# Patient Record
Sex: FEMALE | Race: WHITE | NOT HISPANIC OR LATINO | Employment: UNEMPLOYED | URBAN - METROPOLITAN AREA
[De-identification: names, ages, dates, MRNs, and addresses within clinical notes are randomized per-mention and may not be internally consistent; named-entity substitution may affect disease eponyms.]

---

## 2023-10-13 DIAGNOSIS — E28.2 PCOS (POLYCYSTIC OVARIAN SYNDROME): Primary | ICD-10-CM

## 2023-10-13 RX ORDER — METFORMIN HYDROCHLORIDE 500 MG/1
500 TABLET ORAL
Qty: 30 TABLET | Refills: 0 | Status: SHIPPED | OUTPATIENT
Start: 2023-10-13 | End: 2023-11-14

## 2023-10-13 NOTE — TELEPHONE ENCOUNTER
Pt was ast seen 5/24/23 and has an upcoming appt scheduled 11/15/23. She takes metformin and is requesting a RF. Pt is aware 1yr of PNV's were sent to pharm in 6/2023. Metformin RF request and Rx sent to Dr. Sams to sign and send to pharm.

## 2023-11-15 ENCOUNTER — APPOINTMENT (OUTPATIENT)
Dept: OBSTETRICS AND GYNECOLOGY | Facility: CLINIC | Age: 28
End: 2023-11-15
Payer: MEDICAID

## 2023-12-14 ENCOUNTER — OFFICE VISIT (OUTPATIENT)
Dept: OBSTETRICS AND GYNECOLOGY | Facility: CLINIC | Age: 28
End: 2023-12-14
Payer: MEDICAID

## 2023-12-14 VITALS
HEIGHT: 67 IN | SYSTOLIC BLOOD PRESSURE: 108 MMHG | WEIGHT: 137 LBS | DIASTOLIC BLOOD PRESSURE: 74 MMHG | BODY MASS INDEX: 21.5 KG/M2

## 2023-12-14 DIAGNOSIS — Z01.419 WELL WOMAN EXAM: Primary | ICD-10-CM

## 2023-12-14 DIAGNOSIS — Z12.4 CERVICAL CANCER SCREENING: ICD-10-CM

## 2023-12-14 DIAGNOSIS — R10.2 PELVIC PAIN: ICD-10-CM

## 2023-12-14 DIAGNOSIS — Z31.69 ENCOUNTER FOR PRECONCEPTION CONSULTATION: ICD-10-CM

## 2023-12-14 PROCEDURE — 87800 DETECT AGNT MULT DNA DIREC: CPT

## 2023-12-14 PROCEDURE — 88141 CYTOPATH C/V INTERPRET: CPT | Performed by: PATHOLOGY

## 2023-12-14 PROCEDURE — 88175 CYTOPATH C/V AUTO FLUID REDO: CPT

## 2023-12-14 PROCEDURE — 87624 HPV HI-RISK TYP POOLED RSLT: CPT

## 2023-12-14 PROCEDURE — 1036F TOBACCO NON-USER: CPT | Performed by: NURSE PRACTITIONER

## 2023-12-14 PROCEDURE — 87661 TRICHOMONAS VAGINALIS AMPLIF: CPT

## 2023-12-14 PROCEDURE — 99395 PREV VISIT EST AGE 18-39: CPT | Performed by: NURSE PRACTITIONER

## 2023-12-14 RX ORDER — KETOCONAZOLE 20 MG/ML
SHAMPOO, SUSPENSION TOPICAL
COMMUNITY
Start: 2023-06-29 | End: 2024-03-21 | Stop reason: ALTCHOICE

## 2023-12-14 RX ORDER — IBUPROFEN 600 MG/1
600 TABLET ORAL EVERY 6 HOURS PRN
COMMUNITY
Start: 2013-07-03 | End: 2024-03-21 | Stop reason: ALTCHOICE

## 2023-12-14 RX ORDER — MELATONIN 1 MG
TABLET,CHEWABLE ORAL
COMMUNITY
Start: 2020-10-19

## 2023-12-14 ASSESSMENT — PAIN SCALES - GENERAL: PAINLEVEL: 4

## 2023-12-14 NOTE — PROGRESS NOTES
Assessment/Plan   Diagnoses and all orders for this visit:  Well woman exam  Pelvic pain  -     Referral to Physical Therapy; Future  -     US PELVIS TRANSABDOMINAL WITH TRANSVAGINAL; Future  Cervical cancer screening  -     THINPREP PAP TEST  -     C. Trachomatis / N. Gonorrhoeae, Amplified Detection  -     Trichomonas vaginalis, Nucleic Acid Detection  Encounter for preconception consultation    - Encouraged patient to continue using Ovusense and timing intercourse around ovulation    - Advised to return for further work up and referral to fertility clinic if unable to successfully get pregnant after trying 12 months.     Vianey Ziegler, APRN-CNM, APRN-CNP     Subjective   Marcella Lu is a 28 y.o. female who is here for a routine exam.     Concerns today:  Irregular spotting in between periods, new onset in last few months. Having vaginal spotting around ovulation. Reports IUD removed this summer, trying to conceive. Periods were very irregular prior to IUD (40-60 day cycles), reports h/o PCOS.     Patient's last menstrual period was 2023.   Periods are now regular every 28-30 days, lasting 5-8 days.   Dysmenorrhea:none.   Cyclic symptoms include none.     Using Ovusense (basal temp), reports she is getting +ovulation mostly every month.    Sexual Activity: sexually active, male partners; Patient reports 1 partners in the last 12 months.  Pain with intercourse? Yes with certain positions  Loss of desire? No  Able to have an orgasm? Yes     History of prior STI: none    Current contraception: none    Last pap: 2020 NILM  History of abnormal Pap smear: no  Family history of uterine or ovarian cancer: no    Last mammogram: N/A  History of abnormal mammogram: no  Family history of breast cancer: no  Menstrual History:  OB History          1    Para   1    Term   1       0    AB   0    Living   1         SAB   0    IAB   0    Ectopic   0    Multiple   0    Live Births   1               "  Patient's last menstrual period was 11/28/2023.       Objective   /74   Ht 1.702 m (5' 7\")   Wt 62.1 kg (137 lb)   LMP 11/28/2023   BMI 21.46 kg/m²   Physical Exam  Constitutional:       General: She is not in acute distress.     Appearance: Normal appearance.   Genitourinary:      Vulva normal.      Right Labia: No rash, tenderness or skin changes.     Left Labia: No tenderness, skin changes or rash.     No vaginal discharge, erythema, tenderness or bleeding.      No cervical discharge, friability, lesion or polyp.   Breasts:     Breasts are symmetrical.      Breasts are soft.     Right: No mass, nipple discharge, skin change or tenderness.      Left: Normal. No mass, nipple discharge, skin change or tenderness.   Neck:      Thyroid: No thyroid mass, thyromegaly or thyroid tenderness.   Cardiovascular:      Rate and Rhythm: Normal rate and regular rhythm.   Pulmonary:      Effort: Pulmonary effort is normal. No respiratory distress.      Breath sounds: Normal breath sounds.   Abdominal:      Palpations: Abdomen is soft.      Tenderness: There is no abdominal tenderness.   Musculoskeletal:      Cervical back: Neck supple.   Lymphadenopathy:      Upper Body:      Right upper body: No axillary adenopathy.      Left upper body: No axillary adenopathy.   Neurological:      General: No focal deficit present.      Mental Status: She is alert and oriented to person, place, and time. Mental status is at baseline.   Skin:     General: Skin is warm and dry.   Psychiatric:         Mood and Affect: Mood normal.         Behavior: Behavior normal.         Thought Content: Thought content normal.         Judgment: Judgment normal.   Vitals and nursing note reviewed.        "

## 2023-12-16 LAB
C TRACH RRNA SPEC QL NAA+PROBE: NEGATIVE
N GONORRHOEA DNA SPEC QL PROBE+SIG AMP: NEGATIVE
T VAGINALIS RRNA SPEC QL NAA+PROBE: NEGATIVE

## 2024-01-03 ENCOUNTER — ANCILLARY PROCEDURE (OUTPATIENT)
Dept: RADIOLOGY | Facility: CLINIC | Age: 29
End: 2024-01-03
Payer: COMMERCIAL

## 2024-01-03 DIAGNOSIS — R10.2 PELVIC PAIN: ICD-10-CM

## 2024-01-03 PROCEDURE — 76856 US EXAM PELVIC COMPLETE: CPT | Performed by: STUDENT IN AN ORGANIZED HEALTH CARE EDUCATION/TRAINING PROGRAM

## 2024-01-03 PROCEDURE — 76856 US EXAM PELVIC COMPLETE: CPT

## 2024-01-03 PROCEDURE — 76830 TRANSVAGINAL US NON-OB: CPT | Performed by: STUDENT IN AN ORGANIZED HEALTH CARE EDUCATION/TRAINING PROGRAM

## 2024-01-10 DIAGNOSIS — E28.2 PCOS (POLYCYSTIC OVARIAN SYNDROME): ICD-10-CM

## 2024-01-10 LAB
CYTOLOGY CMNT CVX/VAG CYTO-IMP: NORMAL
HPV HR GENOTYPES PNL CVX NAA+PROBE: NEGATIVE
LAB AP HPV GENOTYPE QUESTION: NO
LAB AP HPV HR: NORMAL
LAB AP PAP ADDITIONAL TESTS: NORMAL
LABORATORY COMMENT REPORT: NORMAL
LMP START DATE: NORMAL
PATH REPORT.TOTAL CANCER: NORMAL

## 2024-01-10 RX ORDER — METFORMIN HYDROCHLORIDE 500 MG/1
TABLET ORAL
Qty: 30 TABLET | Refills: 11 | Status: SHIPPED | OUTPATIENT
Start: 2024-01-10

## 2024-02-12 ENCOUNTER — LAB (OUTPATIENT)
Dept: LAB | Facility: LAB | Age: 29
End: 2024-02-12
Payer: COMMERCIAL

## 2024-02-12 ENCOUNTER — TELEPHONE (OUTPATIENT)
Dept: OBSTETRICS AND GYNECOLOGY | Facility: CLINIC | Age: 29
End: 2024-02-12
Payer: COMMERCIAL

## 2024-02-12 DIAGNOSIS — N92.6 MISSED MENSES: Primary | ICD-10-CM

## 2024-02-12 DIAGNOSIS — N92.6 MISSED MENSES: ICD-10-CM

## 2024-02-12 LAB — B-HCG SERPL-ACNC: <2 MIU/ML

## 2024-02-12 PROCEDURE — 36415 COLL VENOUS BLD VENIPUNCTURE: CPT

## 2024-02-12 PROCEDURE — 84702 CHORIONIC GONADOTROPIN TEST: CPT

## 2024-02-29 ENCOUNTER — TELEPHONE (OUTPATIENT)
Dept: OBSTETRICS AND GYNECOLOGY | Facility: CLINIC | Age: 29
End: 2024-02-29
Payer: COMMERCIAL

## 2024-02-29 NOTE — TELEPHONE ENCOUNTER
Fax recieved from [facility], pt dental office requesting a note stating that pt can have dental tx during current pregnancy.  dental note with guidelines faxed back to them @ [phone]. Copy scanned into chart.

## 2024-03-21 ENCOUNTER — INITIAL PRENATAL (OUTPATIENT)
Dept: OBSTETRICS AND GYNECOLOGY | Facility: CLINIC | Age: 29
End: 2024-03-21
Payer: COMMERCIAL

## 2024-03-21 ENCOUNTER — LAB (OUTPATIENT)
Dept: LAB | Facility: LAB | Age: 29
End: 2024-03-21
Payer: COMMERCIAL

## 2024-03-21 VITALS — BODY MASS INDEX: 21.93 KG/M2 | SYSTOLIC BLOOD PRESSURE: 118 MMHG | DIASTOLIC BLOOD PRESSURE: 60 MMHG | WEIGHT: 140 LBS

## 2024-03-21 DIAGNOSIS — O36.80X0 PREGNANCY WITH INCONCLUSIVE FETAL VIABILITY (HHS-HCC): Primary | ICD-10-CM

## 2024-03-21 DIAGNOSIS — O36.80X0 PREGNANCY WITH INCONCLUSIVE FETAL VIABILITY (HHS-HCC): ICD-10-CM

## 2024-03-21 LAB — PREGNANCY TEST URINE, POC: POSITIVE

## 2024-03-21 PROCEDURE — 84702 CHORIONIC GONADOTROPIN TEST: CPT

## 2024-03-21 PROCEDURE — 81025 URINE PREGNANCY TEST: CPT | Performed by: ADVANCED PRACTICE MIDWIFE

## 2024-03-21 PROCEDURE — 36415 COLL VENOUS BLD VENIPUNCTURE: CPT

## 2024-03-21 PROCEDURE — 99213 OFFICE O/P EST LOW 20 MIN: CPT | Performed by: ADVANCED PRACTICE MIDWIFE

## 2024-03-21 ASSESSMENT — EDINBURGH POSTNATAL DEPRESSION SCALE (EPDS)
I HAVE FELT SAD OR MISERABLE: NO, NOT AT ALL
I HAVE BEEN SO UNHAPPY THAT I HAVE BEEN CRYING: NO, NEVER
I HAVE BEEN ABLE TO LAUGH AND SEE THE FUNNY SIDE OF THINGS: AS MUCH AS I ALWAYS COULD
I HAVE BEEN ANXIOUS OR WORRIED FOR NO GOOD REASON: NO, NOT AT ALL
I HAVE BLAMED MYSELF UNNECESSARILY WHEN THINGS WENT WRONG: NO, NEVER
I HAVE FELT SCARED OR PANICKY FOR NO GOOD REASON: NO, NOT AT ALL
THINGS HAVE BEEN GETTING ON TOP OF ME: NO, MOST OF THE TIME I HAVE COPED QUITE WELL
THE THOUGHT OF HARMING MYSELF HAS OCCURRED TO ME: NEVER
I HAVE BEEN SO UNHAPPY THAT I HAVE HAD DIFFICULTY SLEEPING: NOT AT ALL
TOTAL SCORE: 1
I HAVE LOOKED FORWARD WITH ENJOYMENT TO THINGS: AS MUCH AS I EVER DID

## 2024-03-21 NOTE — PROGRESS NOTES
"Assessment/Plan   Problem List Items Addressed This Visit    None  Visit Diagnoses         Codes    Pregnancy with inconclusive fetal viability    -  Primary O36.80X0    Relevant Orders    Human Chorionic Gonadotropin, Serum Quantitative        Beta ordered today, plan to follow pending results    Torie Garcia, DEJA-ROSA     Subjective   Marcella Lu is a 28 y.o. female who presents for evaluation of  inconclusive fetal viability  . The patient believes they could be pregnant. Pregnancy is desired. Sexual Activity: single partner, contraception: none. Current symptoms also include: fatigue and positive home pregnancy test. Last period was abnormal, hx of PCOS.  Menstrual cycles typically last about 42-46 days.   LMP 1/25/2024 had intermittent spotting until 2/2/2024. Light spotting of pink discharge. No spotting since 2/2. 2/12 neg hCG but 2/28 pos at home urine test. Reports mild lower abd cramping that feels \"stretchy\".      Patient's last menstrual period was 01/25/2024.    Objective   /60   Wt 63.5 kg (140 lb)   LMP 01/25/2024   BMI 21.93 kg/m²     Physical Exam  Constitutional:       Appearance: Normal appearance.   Cardiovascular:      Rate and Rhythm: Normal rate and regular rhythm.   Pulmonary:      Effort: Pulmonary effort is normal.      Breath sounds: Normal breath sounds.   Neurological:      Mental Status: She is alert.   Psychiatric:         Mood and Affect: Mood normal.         Behavior: Behavior normal.   Vitals reviewed.          Lab Review  Urine HCG: pending  "

## 2024-03-22 ENCOUNTER — TELEPHONE (OUTPATIENT)
Dept: OBSTETRICS AND GYNECOLOGY | Facility: CLINIC | Age: 29
End: 2024-03-22

## 2024-03-22 ENCOUNTER — APPOINTMENT (OUTPATIENT)
Dept: OBSTETRICS AND GYNECOLOGY | Facility: CLINIC | Age: 29
End: 2024-03-22
Payer: COMMERCIAL

## 2024-03-22 DIAGNOSIS — O36.80X0 PREGNANCY WITH INCONCLUSIVE FETAL VIABILITY, SINGLE OR UNSPECIFIED FETUS (HHS-HCC): ICD-10-CM

## 2024-03-22 LAB — B-HCG SERPL-ACNC: ABNORMAL MIU/ML

## 2024-03-22 NOTE — TELEPHONE ENCOUNTER
Attempted to call pt to discuss need for US.   Got her voice mail.     Message left to call office.   Order placed in system.

## 2024-03-22 NOTE — TELEPHONE ENCOUNTER
----- Message from Katherine Garcia APRN-CNM sent at 3/22/2024  8:51 AM EDT -----  Dating ultrasound asap

## 2024-03-29 ENCOUNTER — TELEPHONE (OUTPATIENT)
Dept: OBSTETRICS AND GYNECOLOGY | Facility: CLINIC | Age: 29
End: 2024-03-29
Payer: COMMERCIAL

## 2024-03-29 ENCOUNTER — HOSPITAL ENCOUNTER (OUTPATIENT)
Dept: RADIOLOGY | Facility: CLINIC | Age: 29
Discharge: HOME | End: 2024-03-29
Payer: COMMERCIAL

## 2024-03-29 DIAGNOSIS — O36.80X0 PREGNANCY WITH INCONCLUSIVE FETAL VIABILITY, SINGLE OR UNSPECIFIED FETUS (HHS-HCC): ICD-10-CM

## 2024-03-29 PROCEDURE — 76801 OB US < 14 WKS SINGLE FETUS: CPT

## 2024-03-29 PROCEDURE — 76815 OB US LIMITED FETUS(S): CPT | Performed by: OBSTETRICS & GYNECOLOGY

## 2024-03-29 NOTE — TELEPHONE ENCOUNTER
Office received message from shaheen regarding MAB findings at pts US appt today.  CNM asking office to call pt.    Pt contacted.    Having no bleeding or pain currently. MAB findings discussed.   Talked about medical and surgical management.   She will talk it over further with her .  For now, pt would like prescriptions called in for misoprostol, ibuprofen and zofran.  Pt may decide to take these this weekend.  Bleeding and cramping expectations explained.  Understanding voiced.

## 2024-04-01 ENCOUNTER — TELEPHONE (OUTPATIENT)
Dept: OBSTETRICS AND GYNECOLOGY | Facility: CLINIC | Age: 29
End: 2024-04-01
Payer: COMMERCIAL

## 2024-04-01 ENCOUNTER — TELEPHONE (OUTPATIENT)
Dept: OBSTETRICS AND GYNECOLOGY | Facility: HOSPITAL | Age: 29
End: 2024-04-01
Payer: COMMERCIAL

## 2024-04-01 DIAGNOSIS — O02.1 MISSED ABORTION (HHS-HCC): Primary | ICD-10-CM

## 2024-04-01 RX ORDER — DOXYCYCLINE 100 MG/1
400 CAPSULE ORAL ONCE
Qty: 4 CAPSULE | Refills: 0 | Status: SHIPPED | OUTPATIENT
Start: 2024-04-01 | End: 2024-04-03 | Stop reason: ALTCHOICE

## 2024-04-01 NOTE — TELEPHONE ENCOUNTER
Patient called to schedule D/ C. Patient informed there no nursing staff at [facility] office to get this scheduled at this time and that I will send out a message for them to give her a call tomorrow to get scheduled.

## 2024-04-02 ENCOUNTER — TELEPHONE (OUTPATIENT)
Dept: OBSTETRICS AND GYNECOLOGY | Facility: CLINIC | Age: 29
End: 2024-04-02
Payer: COMMERCIAL

## 2024-04-02 ENCOUNTER — DOCUMENTATION (OUTPATIENT)
Dept: OBSTETRICS AND GYNECOLOGY | Facility: HOSPITAL | Age: 29
End: 2024-04-02
Payer: COMMERCIAL

## 2024-04-02 NOTE — TELEPHONE ENCOUNTER
Called patient to review recent diagnosis and messages today regarding scheduling D&C.     Pt. Counseled last week regarding management of missed AB. Initially desired Miso, however pt. Spoke to family members and decided against it.     Interested in surgical management, reviewed IPASS procedure. Pt. To be scheduled this week (Wed) with Dr. Orr.   Instructed to call office with any concerns prior.     Katherine Garcia, DEJA-ROSA

## 2024-04-02 NOTE — TELEPHONE ENCOUNTER
Referral for I-PASS per Katherine Garcia.  Scheduled 4/3/24 2:30 pm per Dr Orr instructions reviewed Pt agrees

## 2024-04-03 ENCOUNTER — OFFICE VISIT (OUTPATIENT)
Dept: OBSTETRICS AND GYNECOLOGY | Facility: CLINIC | Age: 29
End: 2024-04-03
Payer: COMMERCIAL

## 2024-04-03 VITALS
BODY MASS INDEX: 22.33 KG/M2 | DIASTOLIC BLOOD PRESSURE: 71 MMHG | HEART RATE: 109 BPM | WEIGHT: 142.6 LBS | SYSTOLIC BLOOD PRESSURE: 142 MMHG

## 2024-04-03 DIAGNOSIS — O02.1 MISSED ABORTION (HHS-HCC): Primary | ICD-10-CM

## 2024-04-03 PROCEDURE — 59820 CARE OF MISCARRIAGE: CPT | Performed by: OBSTETRICS & GYNECOLOGY

## 2024-04-03 PROCEDURE — 1036F TOBACCO NON-USER: CPT | Performed by: OBSTETRICS & GYNECOLOGY

## 2024-04-03 PROCEDURE — 99213 OFFICE O/P EST LOW 20 MIN: CPT | Performed by: OBSTETRICS & GYNECOLOGY

## 2024-04-03 PROCEDURE — 88305 TISSUE EXAM BY PATHOLOGIST: CPT | Mod: TC,SUR | Performed by: OBSTETRICS & GYNECOLOGY

## 2024-04-03 PROCEDURE — 88305 TISSUE EXAM BY PATHOLOGIST: CPT | Performed by: PATHOLOGY

## 2024-04-03 RX ORDER — IBUPROFEN 800 MG/1
TABLET ORAL
COMMUNITY
Start: 2024-03-29

## 2024-04-03 RX ORDER — ONDANSETRON 4 MG/1
TABLET, FILM COATED ORAL
COMMUNITY
Start: 2024-03-29

## 2024-04-03 RX ORDER — MISOPROSTOL 200 UG/1
TABLET ORAL
COMMUNITY
Start: 2024-03-29 | End: 2024-04-03 | Stop reason: ALTCHOICE

## 2024-04-03 NOTE — PROGRESS NOTES
Pt having procedure for D&C tomorrow.  Calls now,  as she had not picked up her pre-op medicine from the pharmacy, that she was to have taken this evening.  Pt states procedure at 2:30 pm tomorrow  Review of chart has only Rx for doxycycline.  Disc, and pt will  tomorrow, as pharmacy opens at 7 am.  To take meds w water only and keep appt for tomorrow afternoon.  Pt states unaware if she should be NPO.  Encouraged to call office first thing in morning for clarification.

## 2024-04-03 NOTE — LETTER
April 3, 2024     KOURTNEY Odonnell  [address]  Department Of Ob/Gyn-Nurse Midwifery  [facility] [address]    Patient: Marcella Lu   YOB: 1995   Date of Visit: 4/3/2024       Dear Dr. Katherine Garcia, KOURTNEY:    Thank you for referring Marcella Lu to me for evaluation. Below are my notes for this consultation.  If you have questions, please do not hesitate to call me. I look forward to following your patient along with you.       Sincerely,     Yandy Orr MD      CC: No Recipients  ______________________________________________________________________________________            Marcella Lu presents today with:   Here for miscarriage management after diagnosed with SAB on 3/29/24  Previously counselled on options; further counselled today, desires surgical management.  D&C performed, tolerated well.    Offered genetic analysis of pregnancy tissue, declines  Post procedure precautions discussed  Rh positive, no indication for Rhogam.    Desires to follow-up with Katherine Garcia  Offered bereavement services, declines at this time.    Discussed possibility of pregnancy        -----------------------------------------------------------------------  HPI    Presents today with  Missed ab measuring 8 wks  History of prior normal pregnancy,   Reports PCOS, but no other medical conditions        Visit Vitals  /71   Pulse 109   Wt 64.7 kg (142 lb 9.6 oz)   LMP 2024   Breastfeeding Unknown   BMI 22.33 kg/m²   OB Status Recent pregnancy   Smoking Status Never   BSA 1.75 m²       Physical Exam  Constitutional:       Appearance: Normal appearance.   Genitourinary:      Vulva normal.      Genitourinary Comments: Cervix FT, anteverted uterus     Pulmonary:      Effort: Pulmonary effort is normal.   Abdominal:      General: Abdomen is flat.      Palpations: Abdomen is soft.   Musculoskeletal:      Cervical back: Neck supple.   Neurological:       General: No focal deficit present.      Mental Status: She is alert and oriented to person, place, and time.   Skin:     General: Skin is warm.   Psychiatric:         Mood and Affect: Mood normal.         Behavior: Behavior normal.         Thought Content: Thought content normal.       Dilation and curettage    Date/Time: 4/3/2024 4:54 PM    Performed by: Yandy Orr MD  Authorized by: Yandy Orr MD    Consent:     Consent obtained:  Written    Consent given by:  Patient    Risks, benefits, and alternatives were discussed: yes      Risks discussed:  Bleeding, infection and pain  Universal protocol:     Immediately prior to procedure, a time out was called: yes      Patient identity confirmed:  Verbally with patient  Indications:     Indications:  Missed  @ 8 wks  Pre-procedure details:     Skin preparation:  Chlorhexidine    Preparation: Patient was prepped and draped in the usual sterile fashion    Sedation:     Sedation type:  None  Anesthesia:     Anesthesia method:  Local infiltration    Local anesthetic:  Lidocaine 1% w/o epi  Procedure specific details:        patient placed in lithotomy. bimanual exam performed, uterus anteverted, cervix FT dilated.   speculum placed in vagina, with good visualization of the cervix. chlorhexidine used to clean the face of the cervix.   1 mL of 1% lidocaine was injected @ 12 o'clock, with 10 mL @ 3 o'clock, and 10 mL @ 9 o'clock.   a single toothed tenaculum was placed @ 12 o'clock.   the cervix was serially dilated to 8 mm with the bailey dilators under ultrasound visualization. the MVA 7 mm curette was advanced to the fundus, and the device was activated. 2 passes were performed, with a gritty texture noted after the 2nd pass.   on bedside u/s, the endometrial stripe was noted to be thin. bleeding was minimal at the end of the procedure. the instruments were removed from the vagina. all counts were correct.   the products of conception were examined. villi  and a GS were noted. the patient tolerated the procedure well.    Post-procedure details:     Procedure completion:  Tolerated

## 2024-04-03 NOTE — PROGRESS NOTES
Marcella Lu presents today with:   Here for miscarriage management after diagnosed with SAB on 3/29/24  Previously counselled on options; further counselled today, desires surgical management.  D&C performed, tolerated well.    Offered genetic analysis of pregnancy tissue, declines  Post procedure precautions discussed  Rh positive, no indication for Rhogam.    Desires to follow-up with Katherine Garcia  Offered bereavement services, declines at this time.    Discussed possibility of pregnancy        -----------------------------------------------------------------------  HPI    Presents today with  Missed ab measuring 8 wks  History of prior normal pregnancy,   Reports PCOS, but no other medical conditions        Visit Vitals  /71   Pulse 109   Wt 64.7 kg (142 lb 9.6 oz)   LMP 2024   Breastfeeding Unknown   BMI 22.33 kg/m²   OB Status Recent pregnancy   Smoking Status Never   BSA 1.75 m²       Physical Exam  Constitutional:       Appearance: Normal appearance.   Genitourinary:      Vulva normal.      Genitourinary Comments: Cervix FT, anteverted uterus     Pulmonary:      Effort: Pulmonary effort is normal.   Abdominal:      General: Abdomen is flat.      Palpations: Abdomen is soft.   Musculoskeletal:      Cervical back: Neck supple.   Neurological:      General: No focal deficit present.      Mental Status: She is alert and oriented to person, place, and time.   Skin:     General: Skin is warm.   Psychiatric:         Mood and Affect: Mood normal.         Behavior: Behavior normal.         Thought Content: Thought content normal.       Dilation and curettage    Date/Time: 4/3/2024 4:54 PM    Performed by: Yandy Orr MD  Authorized by: Yandy Orr MD    Consent:     Consent obtained:  Written    Consent given by:  Patient    Risks, benefits, and alternatives were discussed: yes      Risks discussed:  Bleeding, infection and pain  Universal protocol:     Immediately prior to  procedure, a time out was called: yes      Patient identity confirmed:  Verbally with patient  Indications:     Indications:  Missed  @ 8 wks  Pre-procedure details:     Skin preparation:  Chlorhexidine    Preparation: Patient was prepped and draped in the usual sterile fashion    Sedation:     Sedation type:  None  Anesthesia:     Anesthesia method:  Local infiltration    Local anesthetic:  Lidocaine 1% w/o epi  Procedure specific details:        patient placed in lithotomy. bimanual exam performed, uterus anteverted, cervix FT dilated.   speculum placed in vagina, with good visualization of the cervix. chlorhexidine used to clean the face of the cervix.   1 mL of 1% lidocaine was injected @ 12 o'clock, with 10 mL @ 3 o'clock, and 10 mL @ 9 o'clock.   a single toothed tenaculum was placed @ 12 o'clock.   the cervix was serially dilated to 8 mm with the bailey dilators under ultrasound visualization. the MVA 7 mm curette was advanced to the fundus, and the device was activated. 2 passes were performed, with a gritty texture noted after the 2nd pass.   on bedside u/s, the endometrial stripe was noted to be thin. bleeding was minimal at the end of the procedure. the instruments were removed from the vagina. all counts were correct.   the products of conception were examined. villi and a GS were noted. the patient tolerated the procedure well.    Post-procedure details:     Procedure completion:  Tolerated

## 2024-04-08 ENCOUNTER — TELEPHONE (OUTPATIENT)
Dept: OBSTETRICS AND GYNECOLOGY | Facility: CLINIC | Age: 29
End: 2024-04-08
Payer: COMMERCIAL

## 2024-04-08 LAB
LABORATORY COMMENT REPORT: NORMAL
PATH REPORT.FINAL DX SPEC: NORMAL
PATH REPORT.GROSS SPEC: NORMAL
PATH REPORT.RELEVANT HX SPEC: NORMAL
PATH REPORT.TOTAL CANCER: NORMAL

## 2024-04-08 NOTE — TELEPHONE ENCOUNTER
Patient called with concerns of FEATURES CONSISTENT WITH EARLY PARTIAL HYDATIDIFORM MOLE meant. Patient informed it meant there was some fetal tissue present. Patient schedule for a follow up appointment with Katherine SHORT.

## 2024-04-10 ENCOUNTER — LAB (OUTPATIENT)
Dept: LAB | Facility: LAB | Age: 29
End: 2024-04-10
Payer: COMMERCIAL

## 2024-04-10 DIAGNOSIS — O01.1 PARTIAL HYDATIDIFORM MOLE (HHS-HCC): ICD-10-CM

## 2024-04-10 DIAGNOSIS — O01.1 PARTIAL HYDATIDIFORM MOLE (HHS-HCC): Primary | ICD-10-CM

## 2024-04-10 DIAGNOSIS — O08.89 PARTIAL MOLAR PREGNANCY (HHS-HCC): Primary | ICD-10-CM

## 2024-04-10 LAB — B-HCG SERPL-ACNC: 1278 MIU/ML

## 2024-04-10 PROCEDURE — 36415 COLL VENOUS BLD VENIPUNCTURE: CPT

## 2024-04-10 PROCEDURE — 84702 CHORIONIC GONADOTROPIN TEST: CPT

## 2024-04-10 NOTE — PROGRESS NOTES
Discussed results of partial mole on pathology.    Reviewed pathophysiology (genetics of the molar pregnancies), importance of hCG surveillance, avoiding pregnancy, risk of persistent GTN, and briefly planning for next pregnancy (after surveillance completed).    Questions/concerns addressed.    Will plan for weekly hCG levels.

## 2024-04-17 ENCOUNTER — APPOINTMENT (OUTPATIENT)
Dept: OBSTETRICS AND GYNECOLOGY | Facility: CLINIC | Age: 29
End: 2024-04-17
Payer: COMMERCIAL

## 2024-04-19 ENCOUNTER — LAB (OUTPATIENT)
Dept: LAB | Facility: LAB | Age: 29
End: 2024-04-19
Payer: COMMERCIAL

## 2024-04-19 DIAGNOSIS — O08.89 PARTIAL MOLAR PREGNANCY (HHS-HCC): ICD-10-CM

## 2024-04-19 LAB — B-HCG SERPL-ACNC: 60 MIU/ML

## 2024-04-19 PROCEDURE — 84702 CHORIONIC GONADOTROPIN TEST: CPT

## 2024-04-19 PROCEDURE — 36415 COLL VENOUS BLD VENIPUNCTURE: CPT

## 2024-04-22 DIAGNOSIS — O01.1 PARTIAL HYDATIDIFORM MOLE (HHS-HCC): Primary | ICD-10-CM

## 2024-05-02 ENCOUNTER — TELEPHONE (OUTPATIENT)
Dept: OBSTETRICS AND GYNECOLOGY | Facility: CLINIC | Age: 29
End: 2024-05-02

## 2024-05-02 ENCOUNTER — APPOINTMENT (OUTPATIENT)
Dept: OBSTETRICS AND GYNECOLOGY | Facility: CLINIC | Age: 29
End: 2024-05-02
Payer: COMMERCIAL

## 2024-05-07 ENCOUNTER — OFFICE VISIT (OUTPATIENT)
Dept: OTOLARYNGOLOGY | Facility: CLINIC | Age: 29
End: 2024-05-07
Payer: COMMERCIAL

## 2024-05-07 VITALS — DIASTOLIC BLOOD PRESSURE: 88 MMHG | SYSTOLIC BLOOD PRESSURE: 127 MMHG | TEMPERATURE: 97.3 F

## 2024-05-07 DIAGNOSIS — J34.89 NASAL OBSTRUCTION: ICD-10-CM

## 2024-05-07 DIAGNOSIS — R43.8 DECREASED SENSE OF SMELL: ICD-10-CM

## 2024-05-07 DIAGNOSIS — J32.9 CHRONIC SINUSITIS, UNSPECIFIED LOCATION: Primary | ICD-10-CM

## 2024-05-07 DIAGNOSIS — J34.89 NASAL DRAINAGE: ICD-10-CM

## 2024-05-07 PROCEDURE — 1036F TOBACCO NON-USER: CPT | Performed by: OTOLARYNGOLOGY

## 2024-05-07 PROCEDURE — 31231 NASAL ENDOSCOPY DX: CPT | Performed by: OTOLARYNGOLOGY

## 2024-05-07 PROCEDURE — 99213 OFFICE O/P EST LOW 20 MIN: CPT | Performed by: OTOLARYNGOLOGY

## 2024-05-07 RX ORDER — FLUTICASONE PROPIONATE 50 MCG
1 SPRAY, SUSPENSION (ML) NASAL 2 TIMES DAILY
Qty: 16 G | Refills: 11 | Status: SHIPPED | OUTPATIENT
Start: 2024-05-07 | End: 2025-05-07

## 2024-05-07 NOTE — PROGRESS NOTES
History Of Present Illness  Marcella Lu is a 28 y.o. female presenting with chronic sinus issues, which have affected her for many years. Her primary symptoms are nasal congestion, post-nasal drainage, and foul odor. She continues to have sinus infections every several months. She has associated headache, facial pressure, ear pain. Her most recent infection was several months ago. She was given antibiotics at that time, which did help. She does endorse decreased sense of smell. Her nasal drainage recently has become much more thin and water-like in the setting of a recent root canal last week. She also describes her drainage as neon-yellow. She was previously tested for allergies with skin testing, which was negative. She is currently using a sinus rinse three times daily. She is not using Flonase but had used it in the past. She was previously seen by Dr. Juarez two years ago for management of chronic sinusitis. Surgical intervention was planned at that time after maximal medical therapy did not yield significant results, but surgery was deferred as she was actively breastfeeding at that time and decided to delay things.    Past Medical History  She has a past medical history of Contact with and (suspected) exposure to covid-19 (07/23/2020), Encounter for fertility testing (11/29/2020), Encounter for immunization (05/17/2021), Encounter for pregnancy test, result unknown (09/23/2020), Encounter for screening for infections with a predominantly sexual mode of transmission (10/19/2020), Oligomenorrhea, unspecified (10/19/2020), Other conditions influencing health status (07/09/2021), Personal history of other diseases of the female genital tract (10/20/2020), Personal history of other diseases of the respiratory system, Personal history of other drug therapy (01/05/2021), Polycystic ovarian syndrome (10/19/2020), and Urinary tract infection, site not specified (01/08/2021).    Surgical History  She has a past  surgical history that includes Other surgical history (10/19/2020).     Social History  She reports that she has never smoked. She has never used smokeless tobacco. She reports that she does not drink alcohol and does not use drugs.    Family History  No family history on file.     Allergies  Pineapple, Nickel, Nitrofurantoin monohyd/m-cryst, and Trace metals    Review of Systems   Constitutional: Negative.    HENT: Positive for nasal drainage, nasal obstruction, decreased smell  Eyes: Negative.    Respiratory: Negative.     Cardiovascular: Negative.    Gastrointestinal: Negative.    Endocrine: Negative.    Genitourinary: Negative.    Musculoskeletal: Negative.    Skin: Negative.    Allergic/Immunologic: Negative.    Neurological: Negative.    Hematological: Negative.    Psychiatric/Behavioral: Negative.     These systems were reviewed and are negative unless otherwise stated in the HPI      Physical Exam     Constitutional   General appearance: Healthy-appearing, well-nourished, well groomed, in no acute distress.      Voice  Ability to communicate: Normal communication without aids, normal voice quality    Head and Face   Head and face: Atraumatic with no masses, lesions, or scarring.    Salivary glands: No tenderness of the parotid glands or parotid masses. No tenderness of the submandibular glands or submandibular masses.    Facial strength: Normal strength and symmetry, no synkinesis or facial tic.     Eyes   Pupils and irises: EOM intact, PERRLA, conjunctiva non-injected.     Ears  Otoscopic examination: Auditory canals with normal appearance and no obstruction or erythema. Membranes mobile per pneumatic otoscopy, pearly grey, with clear landmarks. No evidence of middle ear effusion.  External inspection of ears: Ears normally formed and free of lesions.     Nose   Dorsum Normal  No nasal lesions, lacerations or scars.  No polyps  Nasal Mucosa Normal  Nasal tip Normal  Septum - minimally deviated to right  along lower bony septum  Inferior turbinates Abnormal- minimally hypertrophic    Oral Cavity/Mouth   Lips, teeth, and gums: Normal lips, gums, and dentition. Recent root canal noted to right maxillary molar    Throat   Oropharynx: Mucosa moist, no lesions. Hard and soft palate normal. Tongue normal, no lesions or edema. No tonsillar masses or lesions. Normal tongue base.    Neck   Neck: Symmetrical, trachea midline.    Thyroid symmetrical, no enlargement, no tenderness, no nodules.     Pulmonary   Respiratory effort: Chest expands symmetrically. No audible wheeze.      Cardiovascular   Peripheral vascular system: No varicosities, carotid pulse normal, no edema. No jugular venous distension    Lymphatic   Palpation of cervical lymph nodes: No palpable lymph node enlargement, no submandibular adenopathy, no anterior cervical adenopathy, no supraclavicular adenopathy    Neurological/Psychiatric   Cranial nerves: Cranial nerves II - XII intact. Normal gait. No nystagmus  Orientation to person, place, and time: Normal.     Mood and affect: Normal.      Extremities   Appearance of extremities: Normal.       Procedure  To better assess for possible active sinus infection, a flexible endoscope was introduced into the nasal cavity bilaterally. No active purulence was noted in the middle meatus or sphenoethomoid recess on either side. Camera was withdrawn, patient tolerated the procedure well.        Last Recorded Vitals  Blood pressure 127/88, temperature 36.3 °C (97.3 °F), unknown if currently breastfeeding.    Relevant Results  Prior to Admission medications    Medication Sig Start Date End Date Taking? Authorizing Provider   benzoyl peroxide (Brevoxyl) 4 % external liquid Face and body wash daily 12/27/12  Yes Historical Provider, MD   ibuprofen 800 mg tablet  3/29/24  Yes Historical Provider, MD   melatonin 1 mg tablet,chewable Chew. 10/19/20  Yes Historical Provider, MD   metFORMIN (Glucophage) 500 mg tablet TAKE 1  TABLET(500 MG) BY MOUTH EVERY DAY WITH A MEAL 1/10/24  Yes Vianey Ziegler JOVANY, APRN-CNM, APRN-CNP   ondansetron (Zofran) 4 mg tablet  3/29/24  Yes Historical Provider, MD   prenatal no115/iron/folic acid (PRENATAL 19 ORAL) Take 1 tablet by mouth once daily. 6/10/20  Yes Historical Provider, MD     No results found.       Assessment/Plan   Problem List Items Addressed This Visit    None  Visit Diagnoses       Chronic sinusitis, unspecified location    -  Primary    Nasal drainage        Nasal obstruction        Decreased sense of smell               Marcella presents for evaluation of chronic rhinosinusitis, previously confirmed on CT sinus from 2022, with continued bouts of acute sinus infections that require antibiotics every few months. Her scope today did not reveal active infection, possibly due to the recent treatment with antibiotics following a dental procedure, but given her infection frequency and continued symptoms between infections, we will refer her to Rhinology division/Dr. Gordon to consider course of prolonged antibiotics/steroids prior to repeat imaging or consideration of surgical management. We did consider that her symptoms could be related to odontogenic sources. She was given a prescription for fluticasone spray and will continue irrigating several times daily. She was instructed to use Flonase 10-15 minutes after irrigations to prevent washout of the medication. All other questions were answered.      Jonathan Frankel, MD  Facial Plastic & Reconstructive Surgery  Otolaryngology - Head & Neck Surgery

## 2024-05-13 ENCOUNTER — LAB (OUTPATIENT)
Dept: LAB | Facility: LAB | Age: 29
End: 2024-05-13
Payer: COMMERCIAL

## 2024-05-13 DIAGNOSIS — O01.1 PARTIAL HYDATIDIFORM MOLE (HHS-HCC): ICD-10-CM

## 2024-05-13 LAB — B-HCG SERPL-ACNC: <3 MIU/ML

## 2024-05-13 PROCEDURE — 36415 COLL VENOUS BLD VENIPUNCTURE: CPT

## 2024-05-13 PROCEDURE — 84702 CHORIONIC GONADOTROPIN TEST: CPT

## 2024-05-15 ENCOUNTER — TELEPHONE (OUTPATIENT)
Dept: OBSTETRICS AND GYNECOLOGY | Facility: CLINIC | Age: 29
End: 2024-05-15
Payer: COMMERCIAL

## 2024-05-16 DIAGNOSIS — O01.1 PARTIAL HYDATIDIFORM MOLE (HHS-HCC): Primary | ICD-10-CM

## 2024-05-17 ENCOUNTER — TELEPHONE (OUTPATIENT)
Dept: OBSTETRICS AND GYNECOLOGY | Facility: CLINIC | Age: 29
End: 2024-05-17
Payer: COMMERCIAL

## 2024-05-17 NOTE — TELEPHONE ENCOUNTER
Pt notified of results and plan    ----- Message from Yandy Orr MD sent at 5/16/2024 10:32 PM EDT -----  Your pregnancy hormone level is NEGATIVE!  This is great.  Please repeat in 1 month to make sure still negative.

## 2024-06-05 ENCOUNTER — APPOINTMENT (OUTPATIENT)
Dept: OBSTETRICS AND GYNECOLOGY | Facility: CLINIC | Age: 29
End: 2024-06-05
Payer: COMMERCIAL

## 2024-06-12 ENCOUNTER — APPOINTMENT (OUTPATIENT)
Dept: OBSTETRICS AND GYNECOLOGY | Facility: CLINIC | Age: 29
End: 2024-06-12
Payer: COMMERCIAL

## 2024-06-12 ENCOUNTER — LAB (OUTPATIENT)
Dept: LAB | Facility: LAB | Age: 29
End: 2024-06-12
Payer: COMMERCIAL

## 2024-06-12 VITALS
SYSTOLIC BLOOD PRESSURE: 120 MMHG | DIASTOLIC BLOOD PRESSURE: 60 MMHG | WEIGHT: 143 LBS | BODY MASS INDEX: 22.44 KG/M2 | HEIGHT: 67 IN

## 2024-06-12 DIAGNOSIS — O01.1 PARTIAL HYDATIDIFORM MOLE (HHS-HCC): Primary | ICD-10-CM

## 2024-06-12 DIAGNOSIS — E28.2 PCOS (POLYCYSTIC OVARIAN SYNDROME): ICD-10-CM

## 2024-06-12 DIAGNOSIS — O01.1 PARTIAL HYDATIDIFORM MOLE (HHS-HCC): ICD-10-CM

## 2024-06-12 LAB
B-HCG SERPL-ACNC: <3 MIU/ML
PROGEST SERPL-MCNC: 1.1 NG/ML

## 2024-06-12 PROCEDURE — 84144 ASSAY OF PROGESTERONE: CPT

## 2024-06-12 PROCEDURE — 99213 OFFICE O/P EST LOW 20 MIN: CPT | Performed by: OBSTETRICS & GYNECOLOGY

## 2024-06-12 PROCEDURE — 36415 COLL VENOUS BLD VENIPUNCTURE: CPT

## 2024-06-12 PROCEDURE — 1036F TOBACCO NON-USER: CPT | Performed by: OBSTETRICS & GYNECOLOGY

## 2024-06-12 PROCEDURE — 84702 CHORIONIC GONADOTROPIN TEST: CPT

## 2024-06-12 RX ORDER — AMOXICILLIN 500 MG/1
TABLET, FILM COATED ORAL
COMMUNITY
Start: 2024-06-11

## 2024-06-12 ASSESSMENT — ENCOUNTER SYMPTOMS
PALPITATIONS: 0
FREQUENCY: 0
HEMATURIA: 0
COUGH: 0
DYSURIA: 0
WEAKNESS: 0
VOMITING: 0
CONSTIPATION: 0
FEVER: 0
SHORTNESS OF BREATH: 0
NAUSEA: 0
CHILLS: 0
DIARRHEA: 0
ABDOMINAL PAIN: 0
DIZZINESS: 0
HEADACHES: 0

## 2024-06-12 ASSESSMENT — PAIN SCALES - GENERAL: PAINLEVEL: 0-NO PAIN

## 2024-06-12 NOTE — PROGRESS NOTES
SUBJECTIVE    28 y.o.  Having periods presents for   Chief Complaint   Patient presents with    Follow-up     Her for D+C follow up   Ana Coe CMA        Here for follow up from molar pregnancy. Had menses 24.    OB/GYN History  Patient's last menstrual period was 2024.    Social History     Substance and Sexual Activity   Sexual Activity Yes       Sexually transmitted infections:no past history    OB History    Para Term  AB Living   2 1 1 0 1 1   SAB IAB Ectopic Multiple Live Births   1 0 0 0 1      # Outcome Date GA Lbr Georges/2nd Weight Sex Delivery Anes PTL Lv   2 Term 2021 39w0d  3.402 kg M Vag-Spont EPI  MIKE   1 SAB      Incomplete S          The following portions of the chart were reviewed this encounter and updated as appropriate:    Tobacco  Allergies  Meds  Problems  Med Hx  Surg Hx  Fam Hx         Screenings  Social Determinants of Health     Tobacco Use: Low Risk  (2024)    Patient History     Smoking Tobacco Use: Never     Smokeless Tobacco Use: Never     Passive Exposure: Not on file   Alcohol Use: Not At Risk (6/10/2020)    Received from [facility]    AUDIT-C     Frequency of Alcohol Consumption: Never     Average Number of Drinks: Not on file     Frequency of Binge Drinking: Not on file   Financial Resource Strain: Not on file   Food Insecurity: Not on file   Transportation Needs: Not on file   Physical Activity: Not on file   Stress: Not on file   Social Connections: Not on file   Intimate Partner Violence: Not on file   Depression: Not on file   Housing Stability: Not on file   Utilities: Not on file   Digital Equity: Not on file   Health Literacy: Not on file         Review of Systems  Review of Systems   Constitutional:  Negative for chills and fever.   Eyes:  Negative for visual disturbance.   Respiratory:  Negative for cough and shortness of breath.    Cardiovascular:  Negative for chest pain and palpitations.   Gastrointestinal:   "Negative for abdominal pain, constipation, diarrhea, nausea and vomiting.   Genitourinary:  Positive for menstrual problem. Negative for dyspareunia, dysuria, frequency, hematuria, urgency, vaginal bleeding and vaginal discharge.   Neurological:  Negative for dizziness, weakness and headaches.        OBJECTIVE  Vitals:    06/12/24 1428   BP: 120/60   Weight: 64.9 kg (143 lb)   Height: 1.702 m (5' 7\")     Body mass index is 22.4 kg/m².     Physical Exam  Constitutional:       General: She is not in acute distress.     Appearance: Normal appearance.   Genitourinary:      Vulva and rectum normal.      Right Labia: No skin changes.     Left Labia: No skin changes.     No vaginal discharge.      No cervical discharge or lesion.   HENT:      Head: Normocephalic and atraumatic.      Nose: Nose normal.      Mouth/Throat:      Mouth: Mucous membranes are moist.      Pharynx: Oropharynx is clear.   Eyes:      Extraocular Movements: Extraocular movements intact.      Conjunctiva/sclera: Conjunctivae normal.      Pupils: Pupils are equal, round, and reactive to light.   Cardiovascular:      Rate and Rhythm: Normal rate.      Pulses: Normal pulses.   Pulmonary:      Effort: Pulmonary effort is normal.   Abdominal:      General: Abdomen is flat. There is no distension.      Palpations: Abdomen is soft.      Tenderness: There is no abdominal tenderness. There is no guarding or rebound.   Musculoskeletal:         General: Normal range of motion.   Neurological:      General: No focal deficit present.      Mental Status: She is alert and oriented to person, place, and time.   Skin:     General: Skin is warm and dry.   Psychiatric:         Mood and Affect: Mood normal.         Behavior: Behavior normal.   Vitals reviewed. Exam conducted with a chaperone present.          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 Day 23 of cycle today - can obtain progesterone to see if ovulatory  - Plan to try conceiving with next cycle if HCG negative today - interested in following up in August to discuss ovulation induction         Relevant Orders    Progesterone       Follow up: 2 months    Adrienne Brandon MD  Obstetrics & Gynecology  06/12/24

## 2024-06-29 ENCOUNTER — APPOINTMENT (OUTPATIENT)
Dept: OTOLARYNGOLOGY | Facility: CLINIC | Age: 29
End: 2024-06-29
Payer: COMMERCIAL

## 2024-06-29 VITALS — BODY MASS INDEX: 22.37 KG/M2 | WEIGHT: 142.5 LBS | HEIGHT: 67 IN

## 2024-06-29 DIAGNOSIS — R09.81 NASAL CONGESTION WITH RHINORRHEA: ICD-10-CM

## 2024-06-29 DIAGNOSIS — J34.2 DEVIATED NASAL SEPTUM: ICD-10-CM

## 2024-06-29 DIAGNOSIS — R43.8 DECREASED SENSE OF SMELL: ICD-10-CM

## 2024-06-29 DIAGNOSIS — J34.89 NASAL CONGESTION WITH RHINORRHEA: ICD-10-CM

## 2024-06-29 DIAGNOSIS — J32.0 CHRONIC MAXILLARY SINUSITIS: Primary | ICD-10-CM

## 2024-06-29 PROCEDURE — 99204 OFFICE O/P NEW MOD 45 MIN: CPT | Performed by: OTOLARYNGOLOGY

## 2024-06-29 PROCEDURE — 1036F TOBACCO NON-USER: CPT | Performed by: OTOLARYNGOLOGY

## 2024-06-29 PROCEDURE — 31231 NASAL ENDOSCOPY DX: CPT | Performed by: OTOLARYNGOLOGY

## 2024-06-29 RX ORDER — AMOXICILLIN AND CLAVULANATE POTASSIUM 875; 125 MG/1; MG/1
875 TABLET, FILM COATED ORAL 2 TIMES DAILY
Qty: 20 TABLET | Refills: 0 | Status: SHIPPED | OUTPATIENT
Start: 2024-06-29 | End: 2024-07-09

## 2024-06-29 ASSESSMENT — PAIN SCALES - GENERAL: PAINLEVEL: 0-NO PAIN
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
## 2024-07-22 ENCOUNTER — APPOINTMENT (OUTPATIENT)
Dept: OTOLARYNGOLOGY | Facility: CLINIC | Age: 29
End: 2024-07-22
Payer: COMMERCIAL

## 2024-08-02 ENCOUNTER — HOSPITAL ENCOUNTER (OUTPATIENT)
Dept: RADIOLOGY | Facility: CLINIC | Age: 29
Discharge: HOME | End: 2024-08-02
Payer: COMMERCIAL

## 2024-08-02 DIAGNOSIS — J32.0 CHRONIC MAXILLARY SINUSITIS: ICD-10-CM

## 2024-08-02 PROCEDURE — 70486 CT MAXILLOFACIAL W/O DYE: CPT

## 2024-08-05 RX ORDER — PNV 119/IRON FUM/FOLIC ACID 29 MG-1 MG
1 TABLET ORAL
Qty: 90 TABLET | Refills: 3 | Status: SHIPPED | OUTPATIENT
Start: 2024-08-05

## 2024-08-07 ENCOUNTER — APPOINTMENT (OUTPATIENT)
Dept: OTOLARYNGOLOGY | Facility: CLINIC | Age: 29
End: 2024-08-07
Payer: COMMERCIAL

## 2024-08-07 DIAGNOSIS — J32.0 CHRONIC MAXILLARY SINUSITIS: Primary | ICD-10-CM

## 2024-08-07 PROCEDURE — 99213 OFFICE O/P EST LOW 20 MIN: CPT | Performed by: OTOLARYNGOLOGY

## 2024-08-07 NOTE — PROGRESS NOTES
An interactive audio and video telecommunication system which permits real time communications between the patient (at the originating site) and provider (at the distant site) was utilized to provide this telehealth service.    Verbal consent was requested and obtained for a telehealth visit.    Chief Complaint:  1.  Chronic sinusitis  2.  Rhinorrhea  3.  Nasal airway obstruction, deviated nasal septum, bilateral inferior turbinate hypertrophy  4.  Decreased sense of smell  5.  Headaches    History Of Present Illness:    Marcella Lu presents since last being seen 6/29/24.    Following that evaluation, she completed a 10-day course of Augmentin and underwent a CT sinus.  This virtual visit was coordinated to review that study.  She denies any sinus infections since her last evaluation but she did get a cold and related congestion but this has improved.    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[facility]-HCC)    PCOS (polycystic ovarian syndrome)      Past Medical History:  She has a past medical history of Contact with and (suspected) exposure to covid-19 (07/23/2020), Encounter for fertility testing (11/29/2020), Encounter for immunization (05/17/2021), Encounter for pregnancy test, result unknown (09/23/2020), Encounter for screening for infections with a predominantly sexual mode of transmission (10/19/2020), Oligomenorrhea, unspecified (10/19/2020), Other conditions influencing health status (07/09/2021), Personal history of other diseases of the female genital tract (10/20/2020), Personal history of other diseases of the respiratory system, Personal history of other drug therapy (01/05/2021), Polycystic ovarian syndrome (10/19/2020), and Urinary tract infection, site not specified (01/08/2021).    Surgical History:  She has a past surgical history that includes Other surgical history (10/19/2020).     Family History:  No family history on file.    Social  History:  She reports that she has never smoked. She has never used smokeless tobacco. She reports that she does not drink alcohol and does not use drugs.     Allergies:  Pineapple, Nickel, Nitrofurantoin monohyd/m-cryst, and Trace metals    Current Meds:    Current Outpatient Medications:     amoxicillin (Amoxil) 500 mg tablet, , Disp: , Rfl:     fluticasone (Flonase) 50 mcg/actuation nasal spray, Administer 1 spray into each nostril 2 times a day. Shake gently. Before first use, prime pump. After use, clean tip and replace cap., Disp: 16 g, Rfl: 11    metFORMIN (Glucophage) 500 mg tablet, TAKE 1 TABLET(500 MG) BY MOUTH EVERY DAY WITH A MEAL, Disp: 30 tablet, Rfl: 11    -iron fum-folic acid (Prenatal 19) 29 mg iron- 1 mg tablet, Take 1 tablet by mouth once daily., Disp: 90 tablet, Rfl: 3    Vitals:  Visit Vitals  OB Status Having periods   Smoking Status Never      Physical Exam:  Virtual visit    Results/Data:  I personally reviewed the CT sinus August 2, 2024 with the patient today.  The left maxillary sinus demonstrated significant improvement in the amount of inflammation from the previous study in 2022.  There were very minimal inflammatory changes within that sinus currently.  The radiologist made a comment about a lucency above one of her left upper teeth and this is clearly visible on the CT.    IMPRESSION:  1. As compared to prior, there has been significant interval improvement in opacification of the left maxillary sinus. Similar appearance of a periapical lucency of the left upper anterior molar (tooth 14) which breaks through the floor of the left maxillary sinus, which may be the etiology of the maxillary sinus abnormalities.  2. Mild mucosal thickening of the bilateral ethmoid air cells and maxillary sinuses.    Provider Impressions:  1.  Chronic sinusitis  2.  Rhinorrhea  3.  Nasal airway obstruction, deviated nasal septum, bilateral inferior turbinate hypertrophy  4.  Decreased sense of  smell  5.  Headaches    Discussion:  Marcella Lu and I reviewed her CT sinus.  She has been doing better in regard to her sinonasal symptoms so I would not recommend any specific intervention at this point in time.  Radiology did comment on a dental finding and she has had a root canal on that tooth previously.  I suggested at her next dental assessment bringing up to her dentist that there were findings on the CT and perhaps an x-ray can be done to confirm this is the expected appearance of that tooth.  In regard to her nose and sinuses, I recommended follow-up with me as needed if her symptoms worsen but if she continues to do well I think her CT is reassuring.  All questions were answered.    Signature:  Deion Gordon MD.

## 2024-08-15 ENCOUNTER — TELEPHONE (OUTPATIENT)
Dept: OBSTETRICS AND GYNECOLOGY | Facility: CLINIC | Age: 29
End: 2024-08-15
Payer: COMMERCIAL

## 2024-08-15 NOTE — TELEPHONE ENCOUNTER
Pt contacted.   Pt advised to check with liset as we sent 90d supply with 3 RES on 8/5/24.  She will call back to let us know if it is not there.

## 2024-08-21 ENCOUNTER — APPOINTMENT (OUTPATIENT)
Dept: OBSTETRICS AND GYNECOLOGY | Facility: CLINIC | Age: 29
End: 2024-08-21
Payer: COMMERCIAL

## 2024-08-21 ENCOUNTER — APPOINTMENT (OUTPATIENT)
Dept: OTOLARYNGOLOGY | Facility: CLINIC | Age: 29
End: 2024-08-21
Payer: COMMERCIAL

## 2024-08-21 VITALS
SYSTOLIC BLOOD PRESSURE: 100 MMHG | WEIGHT: 139 LBS | BODY MASS INDEX: 21.82 KG/M2 | HEIGHT: 67 IN | DIASTOLIC BLOOD PRESSURE: 64 MMHG

## 2024-08-21 DIAGNOSIS — N97.0 SECONDARY ANOVULATORY INFERTILITY: Primary | ICD-10-CM

## 2024-08-21 PROCEDURE — 3008F BODY MASS INDEX DOCD: CPT | Performed by: OBSTETRICS & GYNECOLOGY

## 2024-08-21 PROCEDURE — 99213 OFFICE O/P EST LOW 20 MIN: CPT | Performed by: OBSTETRICS & GYNECOLOGY

## 2024-08-21 PROCEDURE — 1036F TOBACCO NON-USER: CPT | Performed by: OBSTETRICS & GYNECOLOGY

## 2024-08-21 RX ORDER — LETROZOLE 2.5 MG/1
2.5 TABLET, FILM COATED ORAL DAILY
Qty: 5 TABLET | Refills: 0 | Status: SHIPPED | OUTPATIENT
Start: 2024-08-21 | End: 2024-08-26

## 2024-08-21 ASSESSMENT — ENCOUNTER SYMPTOMS
PALPITATIONS: 0
DYSURIA: 0
COUGH: 0
DIARRHEA: 0
ABDOMINAL PAIN: 0
DIZZINESS: 0
CONSTIPATION: 0
SHORTNESS OF BREATH: 0
HEMATURIA: 0
NAUSEA: 0
CHILLS: 0
WEAKNESS: 0
HEADACHES: 0
FREQUENCY: 0
FEVER: 0
VOMITING: 0

## 2024-08-21 ASSESSMENT — PAIN SCALES - GENERAL: PAINLEVEL: 0-NO PAIN

## 2024-08-21 NOTE — PROGRESS NOTES
SUBJECTIVE    28 y.o.  Having periods presents for   Chief Complaint   Patient presents with    Follow-up     Here for 2 month follow up   Ana Coe CMA        Doing well. Using ovulation predictor kit and basal body temperature with last cycle and no ovulation.     OB/GYN History  Patient's last menstrual period was 2024.    Social History     Substance and Sexual Activity   Sexual Activity Yes       Sexually transmitted infections:no past history    OB History    Para Term  AB Living   2 1 1 0 1 1   SAB IAB Ectopic Multiple Live Births   0 0 0 0 1      # Outcome Date GA Lbr Georges/2nd Weight Sex Type Anes PTL Lv   2 Term 2021 39w0d  3.402 kg M Vag-Spont EPI  MIKE   1 Molar      Incomplete S          The following portions of the chart were reviewed this encounter and updated as appropriate:           Screenings  Social Determinants of Health     Tobacco Use: Low Risk  (2024)    Patient History     Smoking Tobacco Use: Never     Smokeless Tobacco Use: Never     Passive Exposure: Not on file   Alcohol Use: Not At Risk (6/10/2020)    [facility]    AUDIT-C     Frequency of Alcohol Consumption: Never     Average Number of Drinks: Not on file     Frequency of Binge Drinking: Not on file   Financial Resource Strain: Not on file   Food Insecurity: Not on file   Transportation Needs: Not on file   Physical Activity: Not on file   Stress: Not on file   Social Connections: Not on file   Intimate Partner Violence: Not on file   Depression: Not on file   Housing Stability: Not on file   Utilities: Not on file   Digital Equity: Not on file   Health Literacy: Not on file         Review of Systems  Review of Systems   Constitutional:  Negative for chills and fever.   Eyes:  Negative for visual disturbance.   Respiratory:  Negative for cough and shortness of breath.    Cardiovascular:  Negative for chest pain and palpitations.   Gastrointestinal:  Negative  "for abdominal pain, constipation, diarrhea, nausea and vomiting.   Genitourinary:  Positive for menstrual problem. Negative for dyspareunia, dysuria, frequency, hematuria, urgency, vaginal bleeding and vaginal discharge.   Neurological:  Negative for dizziness, weakness and headaches.        OBJECTIVE  Vitals:    08/21/24 1444   BP: 100/64   Weight: 63 kg (139 lb)   Height: 1.702 m (5' 7\")     Body mass index is 21.77 kg/m².     Physical Exam  Constitutional:       Appearance: Normal appearance.   HENT:      Head: Normocephalic and atraumatic.      Nose: Nose normal.      Mouth/Throat:      Mouth: Mucous membranes are moist.   Eyes:      Extraocular Movements: Extraocular movements intact.      Pupils: Pupils are equal, round, and reactive to light.   Cardiovascular:      Rate and Rhythm: Normal rate.   Pulmonary:      Effort: Pulmonary effort is normal.   Musculoskeletal:         General: Normal range of motion.      Cervical back: Normal range of motion.   Neurological:      General: No focal deficit present.      Mental Status: She is alert and oriented to person, place, and time.   Skin:     General: Skin is warm and dry.   Psychiatric:         Mood and Affect: Mood normal.         Behavior: Behavior normal.   Vitals and nursing note reviewed.          Last Pap: approximate date 12/2023 and was abnormal: ASCUS HPV neg    Immunization History   Administered Date(s) Administered    Moderna SARS-CoV-2 Vaccination 03/16/2021, 04/13/2021       ASSESSMENT & PLAN  Problem List Items Addressed This Visit          Ob-Gyn Problems    Secondary anovulatory infertility - Primary    Overview     Ovulation Induction with Letrozole  - Patient with ovulatory dysfunction secondary to PCOS and desiring fertility  - After discussing risks, benefits, and alternatives, patient desires to proceed with ovulation induction    - Discussed higher live birth rates with letrozole compared to clomid in patients with PCOS. Reviewed that " ovulation induction is an off-label use for letrozole. Patient expressed understanding and desires to proceed with letrozole  - Discussed that letrozole is an aromatase inhibitor which works by blocking peripheral conversion of androgens to estrogen, resulting in a release of the negative feedback effect of estradiol at the level of the brain and a subsequent rise in FSH. Reviewed common side effects including hot flashes, joint pain, fatigue, and dizziness as well as increased risk of multiple gestation (likely <5%). Discussed theoretical concern for teratogenicity if inadvertently administered early in pregnancy but no apparent increased risk of birth defects otherwise. Will plan for UPT prior to starting   - To trial letrozole 2.5mg x5 days starting on cycle day 3. Encouraged to have intercourse every other day for one week beginning five days after the last day of medication (day 12 of cycle)  - Draw serum progesterone level two weeks after last pill on cycle day 21. If < 3 plan to increase dose of letrozole to 5mg for next cycle         Relevant Medications    letrozole (Femara) 2.5 mg tablet    Other Relevant Orders    Progesterone       Follow up: As needed    Adrienne Brandon MD  Obstetrics & Gynecology  08/21/24

## 2024-09-02 ENCOUNTER — HOSPITAL ENCOUNTER (EMERGENCY)
Facility: HOSPITAL | Age: 29
Discharge: HOME | End: 2024-09-02
Attending: EMERGENCY MEDICINE
Payer: COMMERCIAL

## 2024-09-02 ENCOUNTER — APPOINTMENT (OUTPATIENT)
Dept: CARDIOLOGY | Facility: HOSPITAL | Age: 29
End: 2024-09-02
Payer: COMMERCIAL

## 2024-09-02 ENCOUNTER — APPOINTMENT (OUTPATIENT)
Dept: RADIOLOGY | Facility: HOSPITAL | Age: 29
End: 2024-09-02
Payer: COMMERCIAL

## 2024-09-02 VITALS
WEIGHT: 141 LBS | BODY MASS INDEX: 22.13 KG/M2 | TEMPERATURE: 98.4 F | SYSTOLIC BLOOD PRESSURE: 119 MMHG | RESPIRATION RATE: 16 BRPM | OXYGEN SATURATION: 100 % | HEART RATE: 73 BPM | HEIGHT: 67 IN | DIASTOLIC BLOOD PRESSURE: 78 MMHG

## 2024-09-02 DIAGNOSIS — B34.9 VIRAL SYNDROME: Primary | ICD-10-CM

## 2024-09-02 LAB
ALBUMIN SERPL BCP-MCNC: 4.6 G/DL (ref 3.4–5)
ALP SERPL-CCNC: 30 U/L (ref 33–110)
ALT SERPL W P-5'-P-CCNC: 17 U/L (ref 7–45)
ANION GAP SERPL CALC-SCNC: 11 MMOL/L (ref 10–20)
AST SERPL W P-5'-P-CCNC: 16 U/L (ref 9–39)
B-HCG SERPL-ACNC: <2 MIU/ML
BASOPHILS # BLD AUTO: 0.04 X10*3/UL (ref 0–0.1)
BASOPHILS NFR BLD AUTO: 1 %
BILIRUB SERPL-MCNC: 0.7 MG/DL (ref 0–1.2)
BUN SERPL-MCNC: 10 MG/DL (ref 6–23)
CALCIUM SERPL-MCNC: 9.2 MG/DL (ref 8.6–10.3)
CHLORIDE SERPL-SCNC: 104 MMOL/L (ref 98–107)
CO2 SERPL-SCNC: 26 MMOL/L (ref 21–32)
CREAT SERPL-MCNC: 0.75 MG/DL (ref 0.5–1.05)
EGFRCR SERPLBLD CKD-EPI 2021: >90 ML/MIN/1.73M*2
EOSINOPHIL # BLD AUTO: 0.1 X10*3/UL (ref 0–0.7)
EOSINOPHIL NFR BLD AUTO: 2.6 %
ERYTHROCYTE [DISTWIDTH] IN BLOOD BY AUTOMATED COUNT: 12.4 % (ref 11.5–14.5)
FLUAV RNA RESP QL NAA+PROBE: NOT DETECTED
FLUBV RNA RESP QL NAA+PROBE: NOT DETECTED
GLUCOSE BLD MANUAL STRIP-MCNC: 110 MG/DL (ref 74–99)
GLUCOSE SERPL-MCNC: 109 MG/DL (ref 74–99)
HCT VFR BLD AUTO: 40.4 % (ref 36–46)
HGB BLD-MCNC: 13.3 G/DL (ref 12–16)
IMM GRANULOCYTES # BLD AUTO: 0.01 X10*3/UL (ref 0–0.7)
IMM GRANULOCYTES NFR BLD AUTO: 0.3 % (ref 0–0.9)
LYMPHOCYTES # BLD AUTO: 1.3 X10*3/UL (ref 1.2–4.8)
LYMPHOCYTES NFR BLD AUTO: 33.7 %
MCH RBC QN AUTO: 27.6 PG (ref 26–34)
MCHC RBC AUTO-ENTMCNC: 32.9 G/DL (ref 32–36)
MCV RBC AUTO: 84 FL (ref 80–100)
MONOCYTES # BLD AUTO: 0.27 X10*3/UL (ref 0.1–1)
MONOCYTES NFR BLD AUTO: 7 %
MONONUCLEOSIS SCREEN: NEGATIVE
NEUTROPHILS # BLD AUTO: 2.14 X10*3/UL (ref 1.2–7.7)
NEUTROPHILS NFR BLD AUTO: 55.4 %
NRBC BLD-RTO: 0 /100 WBCS (ref 0–0)
PLATELET # BLD AUTO: 254 X10*3/UL (ref 150–450)
POTASSIUM SERPL-SCNC: 3.6 MMOL/L (ref 3.5–5.3)
PROT SERPL-MCNC: 7.3 G/DL (ref 6.4–8.2)
RBC # BLD AUTO: 4.82 X10*6/UL (ref 4–5.2)
S PYO DNA THROAT QL NAA+PROBE: NOT DETECTED
SARS-COV-2 RNA RESP QL NAA+PROBE: NOT DETECTED
SODIUM SERPL-SCNC: 137 MMOL/L (ref 136–145)
WBC # BLD AUTO: 3.9 X10*3/UL (ref 4.4–11.3)

## 2024-09-02 PROCEDURE — 73630 X-RAY EXAM OF FOOT: CPT | Mod: RT

## 2024-09-02 PROCEDURE — 96374 THER/PROPH/DIAG INJ IV PUSH: CPT

## 2024-09-02 PROCEDURE — 85025 COMPLETE CBC W/AUTO DIFF WBC: CPT | Performed by: EMERGENCY MEDICINE

## 2024-09-02 PROCEDURE — 87636 SARSCOV2 & INF A&B AMP PRB: CPT

## 2024-09-02 PROCEDURE — 87651 STREP A DNA AMP PROBE: CPT

## 2024-09-02 PROCEDURE — 84702 CHORIONIC GONADOTROPIN TEST: CPT | Performed by: EMERGENCY MEDICINE

## 2024-09-02 PROCEDURE — 86308 HETEROPHILE ANTIBODY SCREEN: CPT

## 2024-09-02 PROCEDURE — 93005 ELECTROCARDIOGRAM TRACING: CPT

## 2024-09-02 PROCEDURE — 96361 HYDRATE IV INFUSION ADD-ON: CPT

## 2024-09-02 PROCEDURE — 73630 X-RAY EXAM OF FOOT: CPT | Mod: RIGHT SIDE | Performed by: RADIOLOGY

## 2024-09-02 PROCEDURE — 36415 COLL VENOUS BLD VENIPUNCTURE: CPT | Performed by: EMERGENCY MEDICINE

## 2024-09-02 PROCEDURE — 96375 TX/PRO/DX INJ NEW DRUG ADDON: CPT

## 2024-09-02 PROCEDURE — 2500000004 HC RX 250 GENERAL PHARMACY W/ HCPCS (ALT 636 FOR OP/ED)

## 2024-09-02 PROCEDURE — 84075 ASSAY ALKALINE PHOSPHATASE: CPT | Performed by: EMERGENCY MEDICINE

## 2024-09-02 PROCEDURE — 82947 ASSAY GLUCOSE BLOOD QUANT: CPT

## 2024-09-02 PROCEDURE — 99284 EMERGENCY DEPT VISIT MOD MDM: CPT | Mod: 25

## 2024-09-02 RX ORDER — ONDANSETRON 4 MG/1
4 TABLET, FILM COATED ORAL EVERY 6 HOURS
Qty: 12 TABLET | Refills: 0 | Status: SHIPPED | OUTPATIENT
Start: 2024-09-02 | End: 2024-09-05

## 2024-09-02 RX ORDER — KETOROLAC TROMETHAMINE 15 MG/ML
15 INJECTION, SOLUTION INTRAMUSCULAR; INTRAVENOUS ONCE
Status: COMPLETED | OUTPATIENT
Start: 2024-09-02 | End: 2024-09-02

## 2024-09-02 RX ORDER — ONDANSETRON HYDROCHLORIDE 2 MG/ML
4 INJECTION, SOLUTION INTRAVENOUS ONCE
Status: COMPLETED | OUTPATIENT
Start: 2024-09-02 | End: 2024-09-02

## 2024-09-02 ASSESSMENT — LIFESTYLE VARIABLES
HAVE YOU EVER FELT YOU SHOULD CUT DOWN ON YOUR DRINKING: NO
TOTAL SCORE: 0
EVER FELT BAD OR GUILTY ABOUT YOUR DRINKING: NO
EVER HAD A DRINK FIRST THING IN THE MORNING TO STEADY YOUR NERVES TO GET RID OF A HANGOVER: NO
HAVE PEOPLE ANNOYED YOU BY CRITICIZING YOUR DRINKING: NO

## 2024-09-02 ASSESSMENT — PAIN SCALES - GENERAL
PAINLEVEL_OUTOF10: 6
PAINLEVEL_OUTOF10: 5 - MODERATE PAIN
PAINLEVEL_OUTOF10: 0 - NO PAIN

## 2024-09-02 ASSESSMENT — COLUMBIA-SUICIDE SEVERITY RATING SCALE - C-SSRS
6. HAVE YOU EVER DONE ANYTHING, STARTED TO DO ANYTHING, OR PREPARED TO DO ANYTHING TO END YOUR LIFE?: NO
2. HAVE YOU ACTUALLY HAD ANY THOUGHTS OF KILLING YOURSELF?: NO
1. IN THE PAST MONTH, HAVE YOU WISHED YOU WERE DEAD OR WISHED YOU COULD GO TO SLEEP AND NOT WAKE UP?: NO

## 2024-09-02 ASSESSMENT — PAIN DESCRIPTION - PAIN TYPE: TYPE: ACUTE PAIN

## 2024-09-02 ASSESSMENT — PAIN DESCRIPTION - LOCATION: LOCATION: HEAD

## 2024-09-02 ASSESSMENT — PAIN - FUNCTIONAL ASSESSMENT: PAIN_FUNCTIONAL_ASSESSMENT: 0-10

## 2024-09-02 NOTE — ED PROVIDER NOTES
EMERGENCY DEPARTMENT ENCOUNTER      Pt Name: Marcella Lu  MRN: [id number]  Birthdate 1995  Date of evaluation: 9/2/2024  Provider: Remigio Wiseman DO    CHIEF COMPLAINT       Chief Complaint   Patient presents with    Dizziness    Foot Injury         HISTORY OF PRESENT ILLNESS    28-year-old female comes emergency with lightheadedness, headache going on for 3 days, has recent travel history, was at the beach, did stub her toe and has a pain in the right third toe with her foot as well.  No chest pain, shortness of breath.  She is not on any anticoagulation.  Does have a history of polycystic ovarian syndrome, takes metformin.  Having some nausea and decreased p.o. intake as well.  No abdominal pain, frequency, urgency, hematuria, dysuria however does feel dehydrated.  No other symptoms today.      History provided by:  Patient      Nursing Notes were reviewed.    PAST MEDICAL HISTORY     Past Medical History:   Diagnosis Date    Contact with and (suspected) exposure to covid-19 07/23/2020    Encounter for screening laboratory testing for COVID-19 virus    Encounter for fertility testing 11/29/2020    Fertility testing    Encounter for immunization 05/17/2021    Need for Tdap vaccination    Encounter for pregnancy test, result unknown 09/23/2020    Possible pregnancy    Encounter for screening for infections with a predominantly sexual mode of transmission 10/19/2020    Screening for STD (sexually transmitted disease)    Oligomenorrhea, unspecified 10/19/2020    Oligomenorrhea    Other conditions influencing health status 07/09/2021    History of pregnancy    Personal history of other diseases of the female genital tract 10/20/2020    History of female infertility    Personal history of other diseases of the respiratory system     History of sinusitis    Personal history of other drug therapy 01/05/2021    History of influenza vaccination    Polycystic ovarian syndrome 10/19/2020    PCOS (polycystic ovarian  syndrome)    Urinary tract infection, site not specified 01/08/2021    Acute UTI         SURGICAL HISTORY       Past Surgical History:   Procedure Laterality Date    OTHER SURGICAL HISTORY  10/19/2020    No history of surgery         CURRENT MEDICATIONS       Discharge Medication List as of 9/2/2024  4:33 PM        CONTINUE these medications which have NOT CHANGED    Details   amoxicillin (Amoxil) 500 mg tablet Historical Med      fluticasone (Flonase) 50 mcg/actuation nasal spray Administer 1 spray into each nostril 2 times a day. Shake gently. Before first use, prime pump. After use, clean tip and replace cap., Starting Tue 5/7/2024, Until Wed 5/7/2025, Normal      metFORMIN (Glucophage) 500 mg tablet TAKE 1 TABLET(500 MG) BY MOUTH EVERY DAY WITH A MEAL, Normal      -iron fum-folic acid (Prenatal 19) 29 mg iron- 1 mg tablet Take 1 tablet by mouth once daily., Starting Mon 8/5/2024, Normal             ALLERGIES     Pineapple, Nickel, Nitrofurantoin monohyd/m-cryst, and Trace metals    FAMILY HISTORY     No family history on file.       SOCIAL HISTORY       Social History     Socioeconomic History    Marital status:    Tobacco Use    Smoking status: Never    Smokeless tobacco: Never   Vaping Use    Vaping status: Never Used   Substance and Sexual Activity    Alcohol use: Never    Drug use: Never    Sexual activity: Yes       SCREENINGS                        PHYSICAL EXAM    (up to 7 for level 4, 8 or more for level 5)     ED Triage Vitals [09/02/24 1153]   Temperature Heart Rate Respirations BP   36.9 °C (98.4 °F) 95 16 128/87      Pulse Ox Temp Source Heart Rate Source Patient Position   99 % Temporal Monitor Sitting      BP Location FiO2 (%)     Right arm --       Physical Exam  Vitals and nursing note reviewed.   Constitutional:       General: She is not in acute distress.  HENT:      Head: Normocephalic and atraumatic.   Eyes:      General: No scleral icterus.        Right eye: No discharge.          Left eye: No discharge.      Conjunctiva/sclera: Conjunctivae normal.   Cardiovascular:      Rate and Rhythm: Normal rate and regular rhythm.      Pulses: Normal pulses.   Pulmonary:      Effort: Pulmonary effort is normal.   Abdominal:      General: Abdomen is flat.      Palpations: Abdomen is soft.      Tenderness: There is no abdominal tenderness. There is no guarding or rebound.   Musculoskeletal:         General: No deformity.      Right lower leg: No edema.      Left lower leg: No edema.      Comments: There is some bruising over the top of the right third toe.   Skin:     General: Skin is warm and dry.   Neurological:      Mental Status: She is alert and oriented to person, place, and time. Mental status is at baseline.   Psychiatric:         Mood and Affect: Mood normal.         Behavior: Behavior normal.          DIAGNOSTIC RESULTS     LABS:  Labs Reviewed   CBC WITH AUTO DIFFERENTIAL - Abnormal       Result Value    WBC 3.9 (*)     nRBC 0.0      RBC 4.82      Hemoglobin 13.3      Hematocrit 40.4      MCV 84      MCH 27.6      MCHC 32.9      RDW 12.4      Platelets 254      Neutrophils % 55.4      Immature Granulocytes %, Automated 0.3      Lymphocytes % 33.7      Monocytes % 7.0      Eosinophils % 2.6      Basophils % 1.0      Neutrophils Absolute 2.14      Immature Granulocytes Absolute, Automated 0.01      Lymphocytes Absolute 1.30      Monocytes Absolute 0.27      Eosinophils Absolute 0.10      Basophils Absolute 0.04     COMPREHENSIVE METABOLIC PANEL - Abnormal    Glucose 109 (*)     Sodium 137      Potassium 3.6      Chloride 104      Bicarbonate 26      Anion Gap 11      Urea Nitrogen 10      Creatinine 0.75      eGFR >90      Calcium 9.2      Albumin 4.6      Alkaline Phosphatase 30 (*)     Total Protein 7.3      AST 16      Bilirubin, Total 0.7      ALT 17     POCT GLUCOSE - Abnormal    POCT Glucose 110 (*)    GROUP A STREPTOCOCCUS, PCR - Normal    Group A Strep PCR Not Detected     HUMAN  CHORIONIC GONADOTROPIN, SERUM QUANTITATIVE - Normal    HCG, Beta-Quantitative <2      Narrative:      Total HCG measurement is performed using the Barb Patricia Access   Immunoassay which detects intact HCG and free beta HCG subunit.    This test is not indicated for use as a tumor marker.   HCG testing is performed using a different test methodology at [facility] than [facility]. Direct result comparison   should only be made within the same method.       INFLUENZA A AND B PCR - Normal    Flu A Result Not Detected      Flu B Result Not Detected      Narrative:     This assay is an in vitro diagnostic multiplex nucleic acid amplification test for the detection and discrimination of Influenza A & B from nasopharyngeal specimens, and has been validated for use at [facility]. Negative results do not preclude Influenza A/B infections, and should not be used as the sole basis for diagnosis, treatment, or other management decisions. If Influenza A/B and RSV PCR results are negative, testing for Parainfluenza virus, Adenovirus and Metapneumovirus is routinely performed for [facility] [place] and intensive care inpatients, and is available on other patients by placing an add-on request.   SARS-COV-2 PCR - Normal    Coronavirus 2019, PCR Not Detected      Narrative:     This assay has received FDA Emergency Use Authorization (EUA) and is only authorized for the duration of time that circumstances exist to justify the authorization of the emergency use of in vitro diagnostic tests for the detection of SARS-CoV-2 virus and/or diagnosis of COVID-19 infection under section 564(b)(1) of the Act, 21 U.S.C. 360bbb-3(b)(1). This assay is an in vitro diagnostic nucleic acid amplification test for the qualitative detection of SARS-CoV-2 from nasopharyngeal specimens and has been validated for use at [facility]. Negative results do not preclude COVID-19  infections and should not be used as the sole basis for diagnosis, treatment, or other management decisions.     MONONUCLEOSIS SCREEN (HETEROPHILE ANTIBODY) - Normal    Mononucleosis Screen Negative     POCT GLUCOSE METER       All other labs were within normal range or not returned as of this dictation.    Imaging  XR foot right 3+ views   Final Result   1. No acute fracture or malalignment of the right foot.        MACRO:   None.        Signed by: Haydee Giang 9/2/2024 2:11 PM   Dictation workstation:   [id number]           Procedures  Procedures     EMERGENCY DEPARTMENT COURSE/MDM:     Diagnoses as of 09/02/24 1713   Viral syndrome        Medical Decision Making  28-year-old female comes emergency room with lightheadedness, headache for 3 days.  Symptomatology sounds viral, did order CBC, chemistry, basic lab work given she has decreased p.o. intake.  Pregnancy test, monotest ordered along with strep, flu and COVID testing.  X-ray was ordered of her right foot as well.    I did review the x-ray did not see any acute bony pathology, radiology did corroborate this.  My independent interpretation of labs shows a leukopenia, consistent with possible viral pathology however viral testing here was negative.  Chemistry was unremarkable, pregnancy test negative, monotest negative.  Patient was given 4 mg IV Zofran, 50 mg IV Toradol, 1 L IV lactated Ringer's, reassessment improvement of her symptoms, will discharge home with prescription for Zofran, will follow-up with her PCP, return for any new, concerning or worsening symptoms.  She was amenable to this plan.    Amount and/or Complexity of Data Reviewed  Labs: ordered.  Radiology: ordered.  ECG/medicine tests: ordered and independent interpretation performed.     Details: EKG shows sinus rhythm 80 bpm, QTc 442, no acute injury pattern.        Patient and or family in agreement and understanding of treatment plan.  All questions answered.      I reviewed the case  with the attending ED physician. The attending ED physician agrees with the plan. Patient and/or patient´s representative was counseled regarding labs, imaging, likely diagnosis, and plan. All questions were answered.    ED Medications administered this visit:    Medications   ondansetron (Zofran) injection 4 mg (4 mg intravenous Given 9/2/24 1304)   ketorolac (Toradol) injection 15 mg (15 mg intravenous Given 9/2/24 1305)   lactated Ringer's bolus 1,000 mL (0 mL intravenous Stopped 9/2/24 1524)       New Prescriptions from this visit:    Discharge Medication List as of 9/2/2024  4:33 PM        START taking these medications    Details   ondansetron (Zofran) 4 mg tablet Take 1 tablet (4 mg) by mouth every 6 hours for 3 days., Starting Mon 9/2/2024, Until Thu 9/5/2024, Normal             Follow-up:  Cristian Ochoa DO  [id number] [facility] [address]  [place] [id number] [phone]    Schedule an appointment as soon as possible for a visit           Final Impression:   1. Viral syndrome          (Please note that portions of this note were completed with a voice recognition program.  Efforts were made to edit the dictations but occasionally words are mis-transcribed.)     Remigio Wiseman DO  Resident  09/02/24 7010

## 2024-09-03 LAB
ATRIAL RATE: 80 BPM
P AXIS: 52 DEGREES
P OFFSET: 192 MS
P ONSET: 145 MS
PR INTERVAL: 146 MS
Q ONSET: 218 MS
QRS COUNT: 13 BEATS
QRS DURATION: 92 MS
QT INTERVAL: 384 MS
QTC CALCULATION(BAZETT): 442 MS
QTC FREDERICIA: 423 MS
R AXIS: 72 DEGREES
T AXIS: 47 DEGREES
T OFFSET: 410 MS
VENTRICULAR RATE: 80 BPM

## 2024-09-11 ENCOUNTER — APPOINTMENT (OUTPATIENT)
Dept: OTOLARYNGOLOGY | Facility: CLINIC | Age: 29
End: 2024-09-11
Payer: COMMERCIAL

## 2024-09-30 ENCOUNTER — APPOINTMENT (OUTPATIENT)
Dept: OTOLARYNGOLOGY | Facility: CLINIC | Age: 29
End: 2024-09-30
Payer: COMMERCIAL

## 2024-11-14 ENCOUNTER — TELEPHONE (OUTPATIENT)
Dept: OBSTETRICS AND GYNECOLOGY | Facility: CLINIC | Age: 29
End: 2024-11-14
Payer: COMMERCIAL

## 2024-11-14 DIAGNOSIS — O46.90 VAGINAL BLEEDING IN PREGNANCY (HHS-HCC): Primary | ICD-10-CM

## 2024-11-14 NOTE — TELEPHONE ENCOUNTER
Called patient. Patient LMP was October 4th. Approx 6 weeks pregnant. Patient has brown tinged discharge. Patient concerned since she had a molar pregnancy previous. Patient stated she has light cramping and stretching. Cramping is not one sided general. Patient denies sex before discharge.     Discussed bleeding precaution, ectopic pregnancy precaution/ when to call the office /and or report to the ED.     Patient has an appointment with Dr. Brandon Nov 27th

## 2024-11-15 ENCOUNTER — HOSPITAL ENCOUNTER (OUTPATIENT)
Dept: RADIOLOGY | Facility: CLINIC | Age: 29
Discharge: HOME | End: 2024-11-15
Payer: COMMERCIAL

## 2024-11-15 ENCOUNTER — LAB (OUTPATIENT)
Dept: LAB | Facility: LAB | Age: 29
End: 2024-11-15
Payer: COMMERCIAL

## 2024-11-15 DIAGNOSIS — O46.90 VAGINAL BLEEDING IN PREGNANCY (HHS-HCC): ICD-10-CM

## 2024-11-15 PROCEDURE — 36415 COLL VENOUS BLD VENIPUNCTURE: CPT

## 2024-11-15 PROCEDURE — 76801 OB US < 14 WKS SINGLE FETUS: CPT

## 2024-11-15 PROCEDURE — 84702 CHORIONIC GONADOTROPIN TEST: CPT

## 2024-11-15 PROCEDURE — 76817 TRANSVAGINAL US OBSTETRIC: CPT

## 2024-11-15 NOTE — TELEPHONE ENCOUNTER
Called patient. Patient notified she can get her ultrasound completed and HCG. Patient has not had any bleeding since yesterday.  ------------------------------------------------------    Dr. Brandon to me:  Hi - I ordered an ultrasound for her so she can schedule that sooner than my visit with her. Otherwise normal bleeding precautions. Thanks! Also placed an order for HCG

## 2024-11-16 LAB — B-HCG SERPL-ACNC: ABNORMAL MIU/ML

## 2024-11-27 ENCOUNTER — APPOINTMENT (OUTPATIENT)
Dept: OBSTETRICS AND GYNECOLOGY | Facility: CLINIC | Age: 29
End: 2024-11-27
Payer: COMMERCIAL

## 2024-11-27 VITALS — BODY MASS INDEX: 22.55 KG/M2 | WEIGHT: 144 LBS | DIASTOLIC BLOOD PRESSURE: 78 MMHG | SYSTOLIC BLOOD PRESSURE: 116 MMHG

## 2024-11-27 DIAGNOSIS — Z3A.08 8 WEEKS GESTATION OF PREGNANCY (HHS-HCC): Primary | ICD-10-CM

## 2024-11-27 DIAGNOSIS — O09.A1: ICD-10-CM

## 2024-11-27 PROBLEM — E28.2 PCOS (POLYCYSTIC OVARIAN SYNDROME): Status: RESOLVED | Noted: 2024-06-12 | Resolved: 2024-11-27

## 2024-11-27 PROBLEM — N97.0 SECONDARY ANOVULATORY INFERTILITY: Status: RESOLVED | Noted: 2024-08-21 | Resolved: 2024-11-27

## 2024-11-27 PROBLEM — O01.1: Status: RESOLVED | Noted: 2024-04-22 | Resolved: 2024-11-27

## 2024-11-27 PROCEDURE — 99214 OFFICE O/P EST MOD 30 MIN: CPT | Performed by: OBSTETRICS & GYNECOLOGY

## 2024-11-27 PROCEDURE — 87591 N.GONORRHOEAE DNA AMP PROB: CPT

## 2024-11-27 PROCEDURE — 87086 URINE CULTURE/COLONY COUNT: CPT

## 2024-11-27 PROCEDURE — 87491 CHLMYD TRACH DNA AMP PROBE: CPT

## 2024-11-27 PROCEDURE — 87661 TRICHOMONAS VAGINALIS AMPLIF: CPT

## 2024-11-27 RX ORDER — ASPIRIN 81 MG/1
162 TABLET ORAL DAILY
Qty: 180 TABLET | Refills: 3 | Status: SHIPPED | OUTPATIENT
Start: 2024-11-27 | End: 2025-11-27

## 2024-11-27 ASSESSMENT — EDINBURGH POSTNATAL DEPRESSION SCALE (EPDS)
THINGS HAVE BEEN GETTING ON TOP OF ME: NO, I HAVE BEEN COPING AS WELL AS EVER
I HAVE FELT SCARED OR PANICKY FOR NO GOOD REASON: NO, NOT AT ALL
THE THOUGHT OF HARMING MYSELF HAS OCCURRED TO ME: NEVER
I HAVE BEEN SO UNHAPPY THAT I HAVE HAD DIFFICULTY SLEEPING: NOT AT ALL
I HAVE FELT SAD OR MISERABLE: NO, NOT AT ALL
I HAVE BEEN ANXIOUS OR WORRIED FOR NO GOOD REASON: NO, NOT AT ALL
I HAVE LOOKED FORWARD WITH ENJOYMENT TO THINGS: AS MUCH AS I EVER DID
I HAVE BEEN ABLE TO LAUGH AND SEE THE FUNNY SIDE OF THINGS: AS MUCH AS I ALWAYS COULD
I HAVE BLAMED MYSELF UNNECESSARILY WHEN THINGS WENT WRONG: NO, NEVER

## 2024-11-27 ASSESSMENT — ENCOUNTER SYMPTOMS
FEVER: 0
CONSTIPATION: 0
SHORTNESS OF BREATH: 0
PALPITATIONS: 0
DIARRHEA: 0
VOMITING: 0
ABDOMINAL PAIN: 0
CHILLS: 0
WEAKNESS: 0
NAUSEA: 0
HEMATURIA: 0
DYSURIA: 0
FREQUENCY: 0
DIZZINESS: 0
COUGH: 0
HEADACHES: 0

## 2024-11-27 NOTE — PROGRESS NOTES
Initial Prenatal Visit    SUBJECTIVE  Marcella Lu is a 29 y.o.  at 8w0d with an estimated date of delivery of 2025, by Ultrasound who presents for an initial prenatal visit.    Doing well. Currently asymptomatic.     OB/GYN History  OB History    Para Term  AB Living   3 1 1 0 1 1   SAB IAB Ectopic Multiple Live Births   0 0 0 0 1      # Outcome Date GA Lbr Georges/2nd Weight Sex Type Anes PTL Lv   3 Current            2 Term 2021 39w0d  3.402 kg M Vag-Spont EPI  MIKE   1 Molar      Incomplete S         Patient's last menstrual period was 2024.    Prior pregnancy complications: Molar pregnancy    The following portions of the chart were reviewed this encounter and updated as appropriate:    Tobacco  Allergies  Meds  Problems  Med Hx  Surg Hx  Fam Hx         Review of Systems  Review of Systems   Constitutional:  Negative for chills and fever.   Eyes:  Negative for visual disturbance.   Respiratory:  Negative for cough and shortness of breath.    Cardiovascular:  Negative for chest pain and palpitations.   Gastrointestinal:  Negative for abdominal pain, constipation, diarrhea, nausea and vomiting.   Genitourinary:  Negative for dyspareunia, dysuria, frequency, hematuria, urgency, vaginal bleeding and vaginal discharge.   Neurological:  Negative for dizziness, weakness and headaches.        OBJECTIVE  Vitals:    24 1436   BP: 116/78   Weight: 65.3 kg (144 lb)          Expected Total Weight Gain: 11.5 kg (25 lb)-16 kg (35 lb)  Pregravid BMI: 21.92    Physical Exam  See prenatal physical exam tab    ASSESSMENT & PLAN    History of molar pregnancy in first trimester, antepartum ([facility])  - Plan for placental path after delivery    Routine care  -Ultrasound performed IUP +FCA  -Initial prenatal labs today, STI screening, Pap up to date  -OB education provided. Recommend prenatal vitamin. Encouraged consumption of low-mercury fish, avoidance of raw/undercooked fish. Encouraged  avoidance of un-heated luncheon meats, hot dogs, unpasteurized cheeses and milks, holbrook or meat spreads, smoked seafood, raw/undercooked meats and eggs, unwashed fruits and vegetables.   -Recommended vaccines (COVID-19, flu).  -Clinical risk assessment for preeclampsia: moderate, low-dose aspirin Rx    Genetic counseling  -Patient was also counseled about options for Down's syndrome and other aneuploidy screening. After discussion, patient desires cffNDA- obtain next visit  -Advised nuchal translucency at 11-13 weeks, anatomy ultrasound at 18-20 weeks.    Pregnancy: Connors     Delivery Plans  Acceptable blood products: All      Follow up in 4 weeks for return OB visit.    Adrienne Brandon MD  Obstetrics & Gynecology  11/27/24

## 2024-11-28 LAB
BACTERIA UR CULT: NORMAL
T VAGINALIS RRNA SPEC QL NAA+PROBE: NEGATIVE

## 2024-11-29 LAB
C TRACH RRNA SPEC QL NAA+PROBE: NEGATIVE
N GONORRHOEA DNA SPEC QL PROBE+SIG AMP: NEGATIVE

## 2024-12-04 ENCOUNTER — PATIENT MESSAGE (OUTPATIENT)
Dept: OBSTETRICS AND GYNECOLOGY | Facility: CLINIC | Age: 29
End: 2024-12-04
Payer: COMMERCIAL

## 2024-12-04 DIAGNOSIS — Z3A.08 8 WEEKS GESTATION OF PREGNANCY (HHS-HCC): Primary | ICD-10-CM

## 2024-12-04 DIAGNOSIS — O09.A1: ICD-10-CM

## 2024-12-12 ENCOUNTER — LAB (OUTPATIENT)
Dept: LAB | Facility: LAB | Age: 29
End: 2024-12-12
Payer: COMMERCIAL

## 2024-12-12 DIAGNOSIS — Z3A.08 8 WEEKS GESTATION OF PREGNANCY (HHS-HCC): ICD-10-CM

## 2024-12-12 LAB
ABO GROUP (TYPE) IN BLOOD: NORMAL
ANTIBODY SCREEN: NORMAL
ERYTHROCYTE [DISTWIDTH] IN BLOOD BY AUTOMATED COUNT: 12.5 % (ref 11.5–14.5)
EST. AVERAGE GLUCOSE BLD GHB EST-MCNC: 80 MG/DL
HBA1C MFR BLD: 4.4 %
HBV SURFACE AG SERPL QL IA: NONREACTIVE
HCT VFR BLD AUTO: 38.3 % (ref 36–46)
HGB BLD-MCNC: 13.1 G/DL (ref 12–16)
HIV 1+2 AB+HIV1 P24 AG SERPL QL IA: NONREACTIVE
MCH RBC QN AUTO: 28.7 PG (ref 26–34)
MCHC RBC AUTO-ENTMCNC: 34.2 G/DL (ref 32–36)
MCV RBC AUTO: 84 FL (ref 80–100)
NRBC BLD-RTO: 0 /100 WBCS (ref 0–0)
PLATELET # BLD AUTO: 228 X10*3/UL (ref 150–450)
RBC # BLD AUTO: 4.56 X10*6/UL (ref 4–5.2)
REFLEX ADDED, ANEMIA PANEL: NORMAL
RH FACTOR (ANTIGEN D): NORMAL
WBC # BLD AUTO: 6.4 X10*3/UL (ref 4.4–11.3)

## 2024-12-12 PROCEDURE — 87340 HEPATITIS B SURFACE AG IA: CPT

## 2024-12-12 PROCEDURE — 83036 HEMOGLOBIN GLYCOSYLATED A1C: CPT

## 2024-12-12 PROCEDURE — 86900 BLOOD TYPING SEROLOGIC ABO: CPT

## 2024-12-12 PROCEDURE — 83020 HEMOGLOBIN ELECTROPHORESIS: CPT | Performed by: OBSTETRICS & GYNECOLOGY

## 2024-12-12 PROCEDURE — 86901 BLOOD TYPING SEROLOGIC RH(D): CPT

## 2024-12-12 PROCEDURE — 85027 COMPLETE CBC AUTOMATED: CPT

## 2024-12-12 PROCEDURE — 87389 HIV-1 AG W/HIV-1&-2 AB AG IA: CPT

## 2024-12-12 PROCEDURE — 86780 TREPONEMA PALLIDUM: CPT

## 2024-12-12 PROCEDURE — 83021 HEMOGLOBIN CHROMOTOGRAPHY: CPT

## 2024-12-12 PROCEDURE — 86317 IMMUNOASSAY INFECTIOUS AGENT: CPT

## 2024-12-12 PROCEDURE — 86803 HEPATITIS C AB TEST: CPT

## 2024-12-12 PROCEDURE — 86850 RBC ANTIBODY SCREEN: CPT

## 2024-12-13 LAB
HCV AB SER QL: NONREACTIVE
HEMOGLOBIN A2: 2.9 % (ref 2–3.5)
HEMOGLOBIN A: 96.7 % (ref 95.8–98)
HEMOGLOBIN F: 0.4 % (ref 0–2)
HEMOGLOBIN IDENTIFICATION INTERPRETATION: NORMAL
PATH REVIEW-HGB IDENTIFICATION: NORMAL
RUBV IGG SERPL IA-ACNC: 1.3 IA
RUBV IGG SERPL QL IA: POSITIVE
TREPONEMA PALLIDUM IGG+IGM AB [PRESENCE] IN SERUM OR PLASMA BY IMMUNOASSAY: NONREACTIVE

## 2024-12-18 ENCOUNTER — APPOINTMENT (OUTPATIENT)
Dept: OBSTETRICS AND GYNECOLOGY | Facility: CLINIC | Age: 29
End: 2024-12-18
Payer: COMMERCIAL

## 2024-12-18 VITALS — BODY MASS INDEX: 23.02 KG/M2 | DIASTOLIC BLOOD PRESSURE: 70 MMHG | SYSTOLIC BLOOD PRESSURE: 118 MMHG | WEIGHT: 147 LBS

## 2024-12-18 DIAGNOSIS — O09.A1: Primary | ICD-10-CM

## 2024-12-18 DIAGNOSIS — Z3A.11 11 WEEKS GESTATION OF PREGNANCY (HHS-HCC): ICD-10-CM

## 2024-12-18 PROCEDURE — 99213 OFFICE O/P EST LOW 20 MIN: CPT | Performed by: OBSTETRICS & GYNECOLOGY

## 2024-12-18 NOTE — PROGRESS NOTES
SUBJECTIVE  Marcella Lu is a 29 y.o.  at 11w0d with an estimated date of delivery of 2025, by Ultrasound who presents for a routine prenatal visit.    She denies loss of fluid, vaginal bleeding, regular contractions/cramping.    OBJECTIVE  Vitals:    24 1130   BP: 118/70   Weight: 66.7 kg (147 lb)          ASSESSMENT & PLAN    11 weeks gestation of pregnancy ([facility])  - Nuchal translucency scheduled  - Myriad testing pending    Follow up in 4 weeks for return OB visit.    Adrienne Brandon MD  Obstetrics & Gynecology  24

## 2024-12-28 LAB
COMMENTS - MP RESULT TYPE: NORMAL
SCAN RESULT: NORMAL

## 2024-12-31 ENCOUNTER — HOSPITAL ENCOUNTER (OUTPATIENT)
Dept: RADIOLOGY | Facility: CLINIC | Age: 29
Discharge: HOME | End: 2024-12-31
Payer: COMMERCIAL

## 2024-12-31 DIAGNOSIS — O46.90 VAGINAL BLEEDING IN PREGNANCY (HHS-HCC): ICD-10-CM

## 2024-12-31 PROCEDURE — 76816 OB US FOLLOW-UP PER FETUS: CPT

## 2024-12-31 PROCEDURE — 76816 OB US FOLLOW-UP PER FETUS: CPT | Performed by: MEDICAL GENETICS

## 2025-01-07 ENCOUNTER — HOSPITAL ENCOUNTER (OUTPATIENT)
Dept: RADIOLOGY | Facility: CLINIC | Age: 30
Discharge: HOME | End: 2025-01-07
Payer: COMMERCIAL

## 2025-01-07 DIAGNOSIS — O46.90 VAGINAL BLEEDING IN PREGNANCY (HHS-HCC): ICD-10-CM

## 2025-01-14 DIAGNOSIS — E28.2 PCOS (POLYCYSTIC OVARIAN SYNDROME): ICD-10-CM

## 2025-01-15 ENCOUNTER — PATIENT MESSAGE (OUTPATIENT)
Dept: OBSTETRICS AND GYNECOLOGY | Facility: CLINIC | Age: 30
End: 2025-01-15

## 2025-01-15 ENCOUNTER — APPOINTMENT (OUTPATIENT)
Dept: OBSTETRICS AND GYNECOLOGY | Facility: CLINIC | Age: 30
End: 2025-01-15
Payer: COMMERCIAL

## 2025-01-15 VITALS — WEIGHT: 151 LBS | DIASTOLIC BLOOD PRESSURE: 74 MMHG | BODY MASS INDEX: 23.65 KG/M2 | SYSTOLIC BLOOD PRESSURE: 118 MMHG

## 2025-01-15 DIAGNOSIS — L65.9 NONSCARRING HAIR LOSS: Primary | ICD-10-CM

## 2025-01-15 DIAGNOSIS — L70.0 ACNE VULGARIS: Primary | ICD-10-CM

## 2025-01-15 PROBLEM — Z3A.15 15 WEEKS GESTATION OF PREGNANCY (HHS-HCC): Status: ACTIVE | Noted: 2024-11-27

## 2025-01-15 PROCEDURE — 99213 OFFICE O/P EST LOW 20 MIN: CPT | Performed by: OBSTETRICS & GYNECOLOGY

## 2025-01-15 RX ORDER — CLINDAMYCIN PHOSPHATE 1 G/10ML
1 GEL TOPICAL DAILY
Qty: 75 ML | Refills: 1 | Status: SHIPPED | OUTPATIENT
Start: 2025-01-15

## 2025-01-15 NOTE — PROGRESS NOTES
SUBJECTIVE  Marcella Lu is a 29 y.o.  at 15w0d with an estimated date of delivery of 2025, by Ultrasound who presents for a routine prenatal visit.    She denies loss of fluid, vaginal bleeding, regular contractions/cramping. Starting to feel movement. Reports increased hair loss and acne.     OBJECTIVE  Vitals:    01/15/25 1337   BP: 118/74   Weight: 68.5 kg (151 lb)          ASSESSMENT & PLAN    15 weeks gestation of pregnancy ([facility])  - Anatomy scheduled    Nonscarring hair loss  - Based on history likely telogen effluvium - no scarring on exam  - TSH and ferritin screen    Follow up in 4 weeks for return OB visit.    Adrienne Brandon MD  Obstetrics & Gynecology  01/15/25

## 2025-01-16 RX ORDER — METFORMIN HYDROCHLORIDE 500 MG/1
TABLET ORAL
Qty: 30 TABLET | Refills: 11 | OUTPATIENT
Start: 2025-01-16

## 2025-01-24 ENCOUNTER — DOCUMENTATION (OUTPATIENT)
Dept: OBSTETRICS AND GYNECOLOGY | Facility: HOSPITAL | Age: 30
End: 2025-01-24
Payer: COMMERCIAL

## 2025-01-24 NOTE — PROGRESS NOTES
RN faxed document to [facility] for sacroiliac support. Document submitted to be scanned to the patient's chart.  JANICE Osborne

## 2025-02-12 ENCOUNTER — APPOINTMENT (OUTPATIENT)
Dept: OBSTETRICS AND GYNECOLOGY | Facility: CLINIC | Age: 30
End: 2025-02-12
Payer: COMMERCIAL

## 2025-02-13 ENCOUNTER — HOSPITAL ENCOUNTER (OUTPATIENT)
Dept: RADIOLOGY | Facility: CLINIC | Age: 30
Discharge: HOME | End: 2025-02-13
Payer: COMMERCIAL

## 2025-02-13 DIAGNOSIS — O46.90 VAGINAL BLEEDING IN PREGNANCY (HHS-HCC): ICD-10-CM

## 2025-02-13 PROCEDURE — 76805 OB US >/= 14 WKS SNGL FETUS: CPT

## 2025-02-19 ENCOUNTER — APPOINTMENT (OUTPATIENT)
Dept: OBSTETRICS AND GYNECOLOGY | Facility: CLINIC | Age: 30
End: 2025-02-19
Payer: COMMERCIAL

## 2025-02-19 VITALS — DIASTOLIC BLOOD PRESSURE: 74 MMHG | WEIGHT: 160 LBS | SYSTOLIC BLOOD PRESSURE: 102 MMHG | BODY MASS INDEX: 25.06 KG/M2

## 2025-02-19 DIAGNOSIS — R35.0 INCREASED URINARY FREQUENCY DURING PREGNANCY (HHS-HCC): Primary | ICD-10-CM

## 2025-02-19 DIAGNOSIS — O26.899 INCREASED URINARY FREQUENCY DURING PREGNANCY (HHS-HCC): Primary | ICD-10-CM

## 2025-02-19 PROBLEM — Z3A.20 20 WEEKS GESTATION OF PREGNANCY (HHS-HCC): Status: ACTIVE | Noted: 2024-11-27

## 2025-02-19 PROCEDURE — 99213 OFFICE O/P EST LOW 20 MIN: CPT | Performed by: OBSTETRICS & GYNECOLOGY

## 2025-02-19 NOTE — ASSESSMENT & PLAN NOTE
- Reviewed plan for OGTT, CBC, syphilis  - Up to date on care  - Reviewed pregnancy precautions including contractions, bleeding, leaking fluid, and decreased fetal movement

## 2025-02-19 NOTE — PROGRESS NOTES
SUBJECTIVE  Marcella Lu is a 29 y.o.  at 20w0d with an estimated date of delivery of 2025, by Ultrasound who presents for a routine prenatal visit.    She denies loss of fluid, vaginal bleeding, regular contractions/cramping, and decreased fetal movement. Having increased urinary frequency x2 weeks and feels like she is incompletely emptying her bladder.     OBJECTIVE  Weight: 72.6 kg (160 lb)   Pregravid BMI: 21.92  BP: 102/74  Fetal Heart Rate: 150      ASSESSMENT & PLAN    20 weeks gestation of pregnancy ([facility])  - Reviewed plan for OGTT, CBC, syphilis  - Up to date on care  - Reviewed pregnancy precautions including contractions, bleeding, leaking fluid, and decreased fetal movement    Increased urinary frequency during pregnancy ([facility])  - Dip unremarkable urine culture ordered    Follow up in 4 weeks for return OB visit.    Adrienne Brandon MD  Obstetrics & Gynecology  25

## 2025-02-20 LAB — TSH SERPL-ACNC: 2.28 MIU/L

## 2025-02-21 LAB — BACTERIA UR CULT: NORMAL

## 2025-03-12 ENCOUNTER — APPOINTMENT (OUTPATIENT)
Dept: OBSTETRICS AND GYNECOLOGY | Facility: CLINIC | Age: 30
End: 2025-03-12
Payer: COMMERCIAL

## 2025-03-12 VITALS — SYSTOLIC BLOOD PRESSURE: 120 MMHG | BODY MASS INDEX: 25.37 KG/M2 | WEIGHT: 162 LBS | DIASTOLIC BLOOD PRESSURE: 60 MMHG

## 2025-03-12 DIAGNOSIS — Z34.80 SUPERVISION OF OTHER NORMAL PREGNANCY, ANTEPARTUM (HHS-HCC): ICD-10-CM

## 2025-03-12 DIAGNOSIS — Z3A.23 23 WEEKS GESTATION OF PREGNANCY (HHS-HCC): Primary | ICD-10-CM

## 2025-03-12 PROCEDURE — 99213 OFFICE O/P EST LOW 20 MIN: CPT | Performed by: OBSTETRICS & GYNECOLOGY

## 2025-03-12 NOTE — PROGRESS NOTES
SUBJECTIVE  Marcella Lu is a 29 y.o.  at 23w0d with an estimated date of delivery of 2025, by Ultrasound who presents for a routine prenatal visit.    She denies loss of fluid, vaginal bleeding, regular contractions/cramping, and decreased fetal movement.     OBJECTIVE  Weight: 73.5 kg (162 lb)   Pregravid BMI: 21.92  BP: 120/60  Fetal Heart Rate: 140      ASSESSMENT & PLAN    23 weeks gestation of pregnancy ([facility])  - OGTT, CBC, syphilis  - Discussed Tdap desires next visit    Increased urinary frequency during pregnancy ([facility])  - Symptoms resolved    History of molar pregnancy in first trimester, antepartum ([facility])  - Plan for placental path after delivery     Follow up in 4 weeks for return OB visit.    Adrienne Brandon MD  Obstetrics & Gynecology  25

## 2025-03-21 PROCEDURE — 82728 ASSAY OF FERRITIN: CPT

## 2025-03-21 PROCEDURE — 85027 COMPLETE CBC AUTOMATED: CPT

## 2025-03-22 ENCOUNTER — LAB (OUTPATIENT)
Dept: LAB | Facility: HOSPITAL | Age: 30
End: 2025-03-22
Payer: COMMERCIAL

## 2025-03-22 DIAGNOSIS — L65.9 NONSCARRING HAIR LOSS: ICD-10-CM

## 2025-03-22 LAB
ERYTHROCYTE [DISTWIDTH] IN BLOOD BY AUTOMATED COUNT: 12.6 % (ref 11.5–14.5)
FERRITIN SERPL-MCNC: 20 NG/ML
GLUCOSE 1H P 50 G GLC PO SERPL-MCNC: 60 MG/DL
HCT VFR BLD AUTO: 36 % (ref 36–46)
HGB BLD-MCNC: 11.8 G/DL (ref 12–16)
MCH RBC QN AUTO: 28.3 PG (ref 26–34)
MCHC RBC AUTO-ENTMCNC: 32.8 G/DL (ref 32–36)
MCV RBC AUTO: 86 FL (ref 80–100)
NRBC BLD-RTO: 0 /100 WBCS (ref 0–0)
PLATELET # BLD AUTO: 219 X10*3/UL (ref 150–450)
RBC # BLD AUTO: 4.17 X10*6/UL (ref 4–5.2)
T PALLIDUM AB SER QL IA: NORMAL
WBC # BLD AUTO: 7.4 X10*3/UL (ref 4.4–11.3)

## 2025-03-23 LAB — REFLEX ADDED, ANEMIA PANEL: NORMAL

## 2025-03-25 LAB
GLUCOSE 1H P 50 G GLC PO SERPL-MCNC: 60 MG/DL
T PALLIDUM AB SER QL IA: NEGATIVE

## 2025-04-09 ENCOUNTER — ROUTINE PRENATAL (OUTPATIENT)
Dept: OBSTETRICS AND GYNECOLOGY | Facility: CLINIC | Age: 30
End: 2025-04-09
Payer: COMMERCIAL

## 2025-04-09 VITALS — DIASTOLIC BLOOD PRESSURE: 70 MMHG | WEIGHT: 170.8 LBS | SYSTOLIC BLOOD PRESSURE: 110 MMHG | BODY MASS INDEX: 26.75 KG/M2

## 2025-04-09 DIAGNOSIS — O26.03 MATERNAL EXCESSIVE WEIGHT GAIN, THIRD TRIMESTER (HHS-HCC): Primary | ICD-10-CM

## 2025-04-09 PROBLEM — R35.0 INCREASED URINARY FREQUENCY DURING PREGNANCY (HHS-HCC): Status: RESOLVED | Noted: 2025-02-19 | Resolved: 2025-04-09

## 2025-04-09 PROBLEM — O26.899 INCREASED URINARY FREQUENCY DURING PREGNANCY (HHS-HCC): Status: RESOLVED | Noted: 2025-02-19 | Resolved: 2025-04-09

## 2025-04-09 PROBLEM — Z3A.27 27 WEEKS GESTATION OF PREGNANCY (HHS-HCC): Status: ACTIVE | Noted: 2024-11-27

## 2025-04-09 PROCEDURE — 90471 IMMUNIZATION ADMIN: CPT | Performed by: OBSTETRICS & GYNECOLOGY

## 2025-04-09 PROCEDURE — 99213 OFFICE O/P EST LOW 20 MIN: CPT | Performed by: OBSTETRICS & GYNECOLOGY

## 2025-04-09 PROCEDURE — 90715 TDAP VACCINE 7 YRS/> IM: CPT | Performed by: OBSTETRICS & GYNECOLOGY

## 2025-04-09 ASSESSMENT — EDINBURGH POSTNATAL DEPRESSION SCALE (EPDS)
I HAVE BEEN SO UNHAPPY THAT I HAVE HAD DIFFICULTY SLEEPING: NOT AT ALL
I HAVE BLAMED MYSELF UNNECESSARILY WHEN THINGS WENT WRONG: NO, NEVER
I HAVE FELT SAD OR MISERABLE: NO, NOT AT ALL
TOTAL SCORE: 0
I HAVE LOOKED FORWARD WITH ENJOYMENT TO THINGS: AS MUCH AS I EVER DID
THE THOUGHT OF HARMING MYSELF HAS OCCURRED TO ME: NEVER
I HAVE BEEN ABLE TO LAUGH AND SEE THE FUNNY SIDE OF THINGS: AS MUCH AS I ALWAYS COULD
I HAVE BEEN SO UNHAPPY THAT I HAVE BEEN CRYING: NO, NEVER
I HAVE FELT SCARED OR PANICKY FOR NO GOOD REASON: NO, NOT AT ALL
I HAVE BEEN ANXIOUS OR WORRIED FOR NO GOOD REASON: NO, NOT AT ALL
THINGS HAVE BEEN GETTING ON TOP OF ME: NO, I HAVE BEEN COPING AS WELL AS EVER

## 2025-04-09 ASSESSMENT — PATIENT HEALTH QUESTIONNAIRE - PHQ9
2. FEELING DOWN, DEPRESSED OR HOPELESS: NOT AT ALL
SUM OF ALL RESPONSES TO PHQ9 QUESTIONS 1 AND 2: 0
SUM OF ALL RESPONSES TO PHQ9 QUESTIONS 1 AND 2: 0
1. LITTLE INTEREST OR PLEASURE IN DOING THINGS: NOT AT ALL
2. FEELING DOWN, DEPRESSED OR HOPELESS: NOT AT ALL
1. LITTLE INTEREST OR PLEASURE IN DOING THINGS: NOT AT ALL

## 2025-04-09 ASSESSMENT — PAIN - FUNCTIONAL ASSESSMENT: PAIN_FUNCTIONAL_ASSESSMENT: 0-10

## 2025-04-09 NOTE — PROGRESS NOTES
SUBJECTIVE  Marcella Lu is a 29 y.o.  at 27w0d with an estimated date of delivery of 2025, by Ultrasound who presents for a routine prenatal visit.    She denies loss of fluid, vaginal bleeding, regular contractions/cramping, and decreased fetal movement. Patient is traveling to [place] reviewed checking with travel restrictions on specific airline and understanding that if she were to have a  delivery in [place] what that may look like.     OBJECTIVE  Weight: 77.5 kg (170 lb 12.8 oz)   Pregravid BMI: 21.92  BP: 110/70         ASSESSMENT & PLAN    27 weeks gestation of pregnancy ([facility])  - Tdap today    Maternal excessive weight gain, third trimester ([facility])  - Plan 30 week growth US    Follow up in 2 weeks for return OB visit.    Adrienne Brandon MD  Obstetrics & Gynecology  25

## 2025-04-23 ENCOUNTER — APPOINTMENT (OUTPATIENT)
Dept: OBSTETRICS AND GYNECOLOGY | Facility: CLINIC | Age: 30
End: 2025-04-23
Payer: COMMERCIAL

## 2025-04-23 VITALS — SYSTOLIC BLOOD PRESSURE: 116 MMHG | DIASTOLIC BLOOD PRESSURE: 70 MMHG | WEIGHT: 173 LBS | BODY MASS INDEX: 27.1 KG/M2

## 2025-04-23 DIAGNOSIS — O26.03 MATERNAL EXCESSIVE WEIGHT GAIN, THIRD TRIMESTER (HHS-HCC): ICD-10-CM

## 2025-04-23 DIAGNOSIS — Z3A.29 29 WEEKS GESTATION OF PREGNANCY (HHS-HCC): Primary | ICD-10-CM

## 2025-04-23 PROCEDURE — 99213 OFFICE O/P EST LOW 20 MIN: CPT | Performed by: OBSTETRICS & GYNECOLOGY

## 2025-04-23 NOTE — PROGRESS NOTES
SUBJECTIVE  Marcella Lu is a 29 y.o.  at 29w0d with an estimated date of delivery of 2025, by Ultrasound who presents for a routine prenatal visit.    She denies loss of fluid, vaginal bleeding, regular contractions/cramping, and decreased fetal movement.     OBJECTIVE  Weight: 78.5 kg (173 lb)   Pregravid BMI: 21.92  BP: 116/70  Fetal Heart Rate: 140      ASSESSMENT & PLAN    Maternal excessive weight gain, third trimester ([facility])  - Plan 30 week growth    29 weeks gestation of pregnancy ([facility])  - Up to date on care    Follow up in 2 weeks for return OB visit.    Adrienne Brandon MD  Obstetrics & Gynecology  25

## 2025-04-25 ENCOUNTER — APPOINTMENT (OUTPATIENT)
Dept: RADIOLOGY | Facility: CLINIC | Age: 30
End: 2025-04-25
Payer: COMMERCIAL

## 2025-05-07 ENCOUNTER — APPOINTMENT (OUTPATIENT)
Dept: OBSTETRICS AND GYNECOLOGY | Facility: CLINIC | Age: 30
End: 2025-05-07
Payer: COMMERCIAL

## 2025-05-07 VITALS — WEIGHT: 176.6 LBS | BODY MASS INDEX: 27.66 KG/M2 | DIASTOLIC BLOOD PRESSURE: 70 MMHG | SYSTOLIC BLOOD PRESSURE: 120 MMHG

## 2025-05-07 DIAGNOSIS — O26.03 MATERNAL EXCESSIVE WEIGHT GAIN, THIRD TRIMESTER (HHS-HCC): ICD-10-CM

## 2025-05-07 DIAGNOSIS — O09.A1: Primary | ICD-10-CM

## 2025-05-07 DIAGNOSIS — Z3A.31 31 WEEKS GESTATION OF PREGNANCY (HHS-HCC): ICD-10-CM

## 2025-05-07 PROCEDURE — 99213 OFFICE O/P EST LOW 20 MIN: CPT | Performed by: OBSTETRICS & GYNECOLOGY

## 2025-05-07 ASSESSMENT — PATIENT HEALTH QUESTIONNAIRE - PHQ9
SUM OF ALL RESPONSES TO PHQ9 QUESTIONS 1 AND 2: 0
2. FEELING DOWN, DEPRESSED OR HOPELESS: NOT AT ALL
1. LITTLE INTEREST OR PLEASURE IN DOING THINGS: NOT AT ALL

## 2025-05-07 ASSESSMENT — PAIN - FUNCTIONAL ASSESSMENT: PAIN_FUNCTIONAL_ASSESSMENT: 0-10

## 2025-05-07 ASSESSMENT — PAIN SCALES - GENERAL: PAINLEVEL_OUTOF10: 0 - NO PAIN

## 2025-05-07 NOTE — PROGRESS NOTES
SUBJECTIVE  Marcella uL is a 29 y.o.  at 31w0d with an estimated date of delivery of 2025, by Ultrasound who presents for a routine prenatal visit.    She denies loss of fluid, vaginal bleeding, regular contractions/cramping, and decreased fetal movement.     OBJECTIVE  Weight: 80.1 kg (176 lb 9.6 oz)   Pregravid BMI: 21.92  BP: 120/70  Fetal Heart Rate: 141 Fundal Height (cm): 32 cm    ASSESSMENT & PLAN    Maternal excessive weight gain, third trimester ([facility])  - Growth scheduled 2025    31 weeks gestation of pregnancy ([facility])  - Up to date on care  - Birth plan reviewed - reviewed reasons augmentation might be required  - Reviewed pregnancy precautions including contractions, bleeding, leaking fluid, and decreased fetal movement    History of molar pregnancy in first trimester, antepartum ([facility])  - Plan for placental path after delivery    Follow up in 2 weeks for return OB visit.    Adrienne Brandon MD  Obstetrics & Gynecology  25

## 2025-05-07 NOTE — ASSESSMENT & PLAN NOTE
- Up to date on care  - Birth plan reviewed - reviewed reasons augmentation might be required  - Reviewed pregnancy precautions including contractions, bleeding, leaking fluid, and decreased fetal movement

## 2025-05-12 ENCOUNTER — HOSPITAL ENCOUNTER (OUTPATIENT)
Dept: RADIOLOGY | Facility: HOSPITAL | Age: 30
Discharge: HOME | End: 2025-05-12

## 2025-05-12 DIAGNOSIS — O46.90 VAGINAL BLEEDING IN PREGNANCY (HHS-HCC): ICD-10-CM

## 2025-05-12 PROCEDURE — 76816 OB US FOLLOW-UP PER FETUS: CPT

## 2025-05-12 PROCEDURE — 76816 OB US FOLLOW-UP PER FETUS: CPT | Performed by: STUDENT IN AN ORGANIZED HEALTH CARE EDUCATION/TRAINING PROGRAM

## 2025-05-14 ENCOUNTER — APPOINTMENT (OUTPATIENT)
Dept: OBSTETRICS AND GYNECOLOGY | Facility: CLINIC | Age: 30
End: 2025-05-14
Payer: COMMERCIAL

## 2025-05-30 ENCOUNTER — APPOINTMENT (OUTPATIENT)
Dept: OBSTETRICS AND GYNECOLOGY | Facility: CLINIC | Age: 30
End: 2025-05-30
Payer: COMMERCIAL

## 2025-05-30 VITALS — DIASTOLIC BLOOD PRESSURE: 80 MMHG | WEIGHT: 178.6 LBS | BODY MASS INDEX: 27.97 KG/M2 | SYSTOLIC BLOOD PRESSURE: 112 MMHG

## 2025-05-30 DIAGNOSIS — Z3A.34 34 WEEKS GESTATION OF PREGNANCY (HHS-HCC): ICD-10-CM

## 2025-05-30 DIAGNOSIS — O26.03 MATERNAL EXCESSIVE WEIGHT GAIN, THIRD TRIMESTER (HHS-HCC): Primary | ICD-10-CM

## 2025-05-30 PROCEDURE — 99213 OFFICE O/P EST LOW 20 MIN: CPT | Performed by: OBSTETRICS & GYNECOLOGY

## 2025-05-30 ASSESSMENT — PATIENT HEALTH QUESTIONNAIRE - PHQ9
SUM OF ALL RESPONSES TO PHQ9 QUESTIONS 1 AND 2: 1
1. LITTLE INTEREST OR PLEASURE IN DOING THINGS: NOT AT ALL
2. FEELING DOWN, DEPRESSED OR HOPELESS: SEVERAL DAYS

## 2025-05-30 ASSESSMENT — PAIN SCALES - GENERAL: PAINLEVEL_OUTOF10: 1

## 2025-05-30 NOTE — PROGRESS NOTES
Ob Follow-up  25     SUBJECTIVE    HPI: Marcella Lu is a 29 y.o.  at 34w2d here for RPNV.  She has irregular contractions, no bleeding, or LOF. Reports normal fetal movement. Patient reports some cramping the past week, mild.        OBJECTIVE  Visit Vitals  /80   Wt 81 kg (178 lb 9.6 oz)   LMP 2024   BMI 27.97 kg/m²   OB Status Pregnant   Smoking Status Never   BSA 1.96 m²            ASSESSMENT & PLAN    Marcella Lu is a 29 y.o.  at 34w2d here for the following concerns we addressed today:    Problem List Items Addressed This Visit          Pregnancy    Maternal excessive weight gain, third trimester ([facility]) - Primary    Overview   - Plan 30 week growth US (EFW 37%tile at 31w5d), scheduled for 37 week ultrasound         34 weeks gestation of pregnancy ([facility])    Overview   Desired provider in labor: [] CNM  [] Physician  [x] Blood Products: [x] Yes, accepts [] No, needs counseling  [x] Initial BMI: Could not be calculated   [x] Prenatal Labs: Normal  [x] Cervical Cancer Screening up to date  [x] Rh status: pos  [x] Genetic Screening: normal  [x] NT US: (11-13 wks) result pending  [x] Baby ASA (if indicated):  [x] Pregnancy dated by: 6 week US    [x] Anatomy US: (19-20 wks)  [x] Federal Sterilization consent signed (if indicated): N/A  [x] 1hr GCT at 24-28wks:  [x] Rhogam (if indicated): N/A  [] Fetal Surveillance (if indicated):  [x] Tdap (27-32 wks, may be given up to 36 wks if initial window missed):   [x] RSV (32-36 wks) (Sept. to end of ): N/A  [] Flu Vaccine:    [x] Breastfeeding: received pump, momcozy  [x] Postpartum Birth control method: Interval ParaGard  [] GBS at 36 - 37 wks:  [x] 39 weeks discussion of IOL vs. Expectant management: Expectant  [] Mode of delivery ( anticipated ):                No orders of the defined types were placed in this encounter.       RTC in 2 weeks      Ginny M Thomas, MD

## 2025-06-04 ENCOUNTER — APPOINTMENT (OUTPATIENT)
Dept: OBSTETRICS AND GYNECOLOGY | Facility: CLINIC | Age: 30
End: 2025-06-04
Payer: COMMERCIAL

## 2025-06-11 ENCOUNTER — APPOINTMENT (OUTPATIENT)
Dept: OBSTETRICS AND GYNECOLOGY | Facility: CLINIC | Age: 30
End: 2025-06-11
Payer: COMMERCIAL

## 2025-06-11 VITALS — BODY MASS INDEX: 27.97 KG/M2 | DIASTOLIC BLOOD PRESSURE: 80 MMHG | SYSTOLIC BLOOD PRESSURE: 120 MMHG | WEIGHT: 178.6 LBS

## 2025-06-11 DIAGNOSIS — Z3A.36 36 WEEKS GESTATION OF PREGNANCY (HHS-HCC): Primary | ICD-10-CM

## 2025-06-11 DIAGNOSIS — O09.A1: ICD-10-CM

## 2025-06-11 PROCEDURE — 99213 OFFICE O/P EST LOW 20 MIN: CPT | Performed by: OBSTETRICS & GYNECOLOGY

## 2025-06-11 ASSESSMENT — PATIENT HEALTH QUESTIONNAIRE - PHQ9
SUM OF ALL RESPONSES TO PHQ9 QUESTIONS 1 AND 2: 0
1. LITTLE INTEREST OR PLEASURE IN DOING THINGS: NOT AT ALL
2. FEELING DOWN, DEPRESSED OR HOPELESS: NOT AT ALL

## 2025-06-11 ASSESSMENT — PAIN SCALES - GENERAL: PAINLEVEL_OUTOF10: 0 - NO PAIN

## 2025-06-11 NOTE — ASSESSMENT & PLAN NOTE
- GBS today  - Up to date on care  - Reviewed pregnancy precautions including contractions, bleeding, leaking fluid, and decreased fetal movement

## 2025-06-11 NOTE — PROGRESS NOTES
SUBJECTIVE  Marcella Lu is a 29 y.o.  at 36w0d with an estimated date of delivery of 2025, by Ultrasound who presents for a routine prenatal visit.    She denies loss of fluid, vaginal bleeding, regular contractions/cramping, and decreased fetal movement.     OBJECTIVE  Weight: 81 kg (178 lb 9.6 oz)   Pregravid BMI: 21.92  BP: 120/80  Fetal Heart Rate: 140      ASSESSMENT & PLAN    History of molar pregnancy in first trimester, antepartum ([facility])  - Plan for placental path after delivery    Maternal excessive weight gain, third trimester ([facility])  - 30 week growth normal    36 weeks gestation of pregnancy ([facility])  - GBS today  - Up to date on care  - Reviewed pregnancy precautions including contractions, bleeding, leaking fluid, and decreased fetal movement    Follow up in 1 week for return OB visit.    Adrienne Brandon MD  Obstetrics & Gynecology  25

## 2025-06-13 LAB — GP B STREP SPEC QL CULT: ABNORMAL

## 2025-06-16 ENCOUNTER — APPOINTMENT (OUTPATIENT)
Dept: OBSTETRICS AND GYNECOLOGY | Facility: CLINIC | Age: 30
End: 2025-06-16
Payer: COMMERCIAL

## 2025-06-16 VITALS — BODY MASS INDEX: 28.13 KG/M2 | WEIGHT: 179.6 LBS | SYSTOLIC BLOOD PRESSURE: 128 MMHG | DIASTOLIC BLOOD PRESSURE: 80 MMHG

## 2025-06-16 DIAGNOSIS — Z3A.36 36 WEEKS GESTATION OF PREGNANCY (HHS-HCC): Primary | ICD-10-CM

## 2025-06-16 DIAGNOSIS — O26.03 MATERNAL EXCESSIVE WEIGHT GAIN, THIRD TRIMESTER (HHS-HCC): ICD-10-CM

## 2025-06-16 PROCEDURE — 99213 OFFICE O/P EST LOW 20 MIN: CPT | Performed by: OBSTETRICS & GYNECOLOGY

## 2025-06-16 RX ORDER — PNV 119/IRON FUM/FOLIC ACID 29 MG-1 MG
1 TABLET ORAL
Qty: 90 TABLET | Refills: 3 | Status: SHIPPED | OUTPATIENT
Start: 2025-06-16

## 2025-06-16 NOTE — PROGRESS NOTES
Ob Follow-up  25     SUBJECTIVE    HPI: Marcella Lu is a 29 y.o.  at 36w5d here for RPNV.  She has irregular contractions, no bleeding, or LOF. Reports normal fetal movement. Patient reports losing some mucous plug       OBJECTIVE  Visit Vitals  /80   Wt 81.5 kg (179 lb 9.6 oz)   LMP 2024   BMI 28.13 kg/m²   OB Status Pregnant   Smoking Status Never   BSA 1.96 m²            ASSESSMENT & PLAN    Marcella Lu is a 29 y.o.  at 36w5d here for the following concerns we addressed today:    Problem List Items Addressed This Visit          Pregnancy    Maternal excessive weight gain, third trimester ([facility])    Overview   - Plan 30 week growth US (EFW 37%tile at 31w5d), scheduled for 37 week ultrasound         Current Assessment & Plan   Patient declines to schedule another growth ultrasound           36 weeks gestation of pregnancy ([facility]) - Primary    Overview   Desired provider in labor: [] CNM  [] Physician  [x] Blood Products: [x] Yes, accepts [] No, needs counseling  [x] Initial BMI: Could not be calculated   [x] Prenatal Labs: Normal  [x] Cervical Cancer Screening up to date  [x] Rh status: pos  [x] Genetic Screening: normal  [x] NT US: (11-13 wks) result pending  [x] Baby ASA (if indicated):  [x] Pregnancy dated by: 6 week US    [x] Anatomy US: (19-20 wks)  [x] Federal Sterilization consent signed (if indicated): N/A  [x] 1hr GCT at 24-28wks:  [x] Rhogam (if indicated): N/A  [] Fetal Surveillance (if indicated):  [x] Tdap (27-32 wks, may be given up to 36 wks if initial window missed):   [x] RSV (32-36 wks) (Sept. to end of ): N/A  [] Flu Vaccine:    [x] Breastfeeding: received pump, momcozy  [x] Postpartum Birth control method: Interval ParaGard  [x] GBS at 36 - 37 wks: positive  [x] 39 weeks discussion of IOL vs. Expectant management: Expectant  [] Mode of delivery ( anticipated ):           Current Assessment & Plan   Reviewed positive GBS result, antibiotics in  labor          Other Visit Diagnoses         Anemia of mother in pregnancy, delivered with postpartum condition        Relevant Medications    -iron fum-folic acid (Prenatal 19) 29 mg iron- 1 mg tablet              No orders of the defined types were placed in this encounter.     RTC in 1 week      Ginny Thomas MD

## 2025-06-17 ENCOUNTER — APPOINTMENT (OUTPATIENT)
Dept: OBSTETRICS AND GYNECOLOGY | Facility: CLINIC | Age: 30
End: 2025-06-17
Payer: COMMERCIAL

## 2025-06-25 ENCOUNTER — APPOINTMENT (OUTPATIENT)
Dept: OBSTETRICS AND GYNECOLOGY | Facility: CLINIC | Age: 30
End: 2025-06-25
Payer: COMMERCIAL

## 2025-06-25 VITALS — BODY MASS INDEX: 28.29 KG/M2 | DIASTOLIC BLOOD PRESSURE: 80 MMHG | SYSTOLIC BLOOD PRESSURE: 102 MMHG | WEIGHT: 180.6 LBS

## 2025-06-25 DIAGNOSIS — O09.A1: Primary | ICD-10-CM

## 2025-06-25 DIAGNOSIS — Z3A.38 38 WEEKS GESTATION OF PREGNANCY (HHS-HCC): ICD-10-CM

## 2025-06-25 PROCEDURE — 99213 OFFICE O/P EST LOW 20 MIN: CPT | Performed by: OBSTETRICS & GYNECOLOGY

## 2025-06-25 ASSESSMENT — PAIN SCALES - GENERAL: PAINLEVEL_OUTOF10: 3

## 2025-06-25 ASSESSMENT — PATIENT HEALTH QUESTIONNAIRE - PHQ9
1. LITTLE INTEREST OR PLEASURE IN DOING THINGS: NOT AT ALL
SUM OF ALL RESPONSES TO PHQ9 QUESTIONS 1 AND 2: 0
2. FEELING DOWN, DEPRESSED OR HOPELESS: NOT AT ALL

## 2025-06-25 NOTE — ASSESSMENT & PLAN NOTE
- Up to date on care  - Membrane sweep today  - Reviewed pregnancy precautions including contractions, bleeding, leaking fluid, and decreased fetal movement

## 2025-06-25 NOTE — PROGRESS NOTES
SUBJECTIVE  Marcella Lu is a 29 y.o.  at 38w0d with an estimated date of delivery of 2025, by Ultrasound who presents for a routine prenatal visit.    She denies loss of fluid, vaginal bleeding, regular contractions/cramping, and decreased fetal movement.     OBJECTIVE  Weight: 81.9 kg (180 lb 9.6 oz)   Pregravid BMI: 21.92  BP: 102/80  Fetal Heart Rate: 143      ASSESSMENT & PLAN    History of molar pregnancy in first trimester, antepartum ([facility])  - Plan for placental path after delivery    Mother positive for group B Streptococcus colonization  - Plan for antibiotics in labor    38 weeks gestation of pregnancy ([facility])  - Up to date on care  - Membrane sweep today  - Reviewed pregnancy precautions including contractions, bleeding, leaking fluid, and decreased fetal movement    Follow up in 1 week for return OB visit.    Adrienne Brandon MD  Obstetrics & Gynecology  25

## 2025-06-28 ENCOUNTER — ANESTHESIA EVENT (OUTPATIENT)
Dept: OBSTETRICS AND GYNECOLOGY | Facility: HOSPITAL | Age: 30
End: 2025-06-28
Payer: COMMERCIAL

## 2025-06-28 ENCOUNTER — HOSPITAL ENCOUNTER (INPATIENT)
Facility: HOSPITAL | Age: 30
End: 2025-06-28
Attending: OBSTETRICS & GYNECOLOGY | Admitting: STUDENT IN AN ORGANIZED HEALTH CARE EDUCATION/TRAINING PROGRAM
Payer: COMMERCIAL

## 2025-06-28 ENCOUNTER — ANESTHESIA (OUTPATIENT)
Dept: OBSTETRICS AND GYNECOLOGY | Facility: HOSPITAL | Age: 30
End: 2025-06-28
Payer: COMMERCIAL

## 2025-06-28 DIAGNOSIS — O13.3 GESTATIONAL HYPERTENSION, THIRD TRIMESTER (HHS-HCC): ICD-10-CM

## 2025-06-28 PROBLEM — Z37.9 NORMAL LABOR (HHS-HCC): Status: ACTIVE | Noted: 2025-06-28

## 2025-06-28 LAB
ERYTHROCYTE [DISTWIDTH] IN BLOOD BY AUTOMATED COUNT: 14.2 % (ref 11.5–14.5)
HCT VFR BLD AUTO: 43.9 % (ref 36–46)
HGB BLD-MCNC: 14.2 G/DL (ref 12–16)
MCH RBC QN AUTO: 26.7 PG (ref 26–34)
MCHC RBC AUTO-ENTMCNC: 32.3 G/DL (ref 32–36)
MCV RBC AUTO: 83 FL (ref 80–100)
NRBC BLD-RTO: 0 /100 WBCS (ref 0–0)
PLATELET # BLD AUTO: 137 X10*3/UL (ref 150–450)
RBC # BLD AUTO: 5.31 X10*6/UL (ref 4–5.2)
WBC # BLD AUTO: 12 X10*3/UL (ref 4.4–11.3)

## 2025-06-28 PROCEDURE — 01967 NEURAXL LBR ANES VAG DLVR: CPT | Performed by: ANESTHESIOLOGY

## 2025-06-28 PROCEDURE — 2500000004 HC RX 250 GENERAL PHARMACY W/ HCPCS (ALT 636 FOR OP/ED): Mod: SE

## 2025-06-28 PROCEDURE — 85027 COMPLETE CBC AUTOMATED: CPT

## 2025-06-28 PROCEDURE — 86901 BLOOD TYPING SEROLOGIC RH(D): CPT

## 2025-06-28 PROCEDURE — 59050 FETAL MONITOR W/REPORT: CPT

## 2025-06-28 PROCEDURE — 36415 COLL VENOUS BLD VENIPUNCTURE: CPT

## 2025-06-28 PROCEDURE — 3700000014 EPIDURAL BLOCK: Mod: GC

## 2025-06-28 PROCEDURE — 1120000001 HC OB PRIVATE ROOM DAILY

## 2025-06-28 PROCEDURE — 2500000004 HC RX 250 GENERAL PHARMACY W/ HCPCS (ALT 636 FOR OP/ED): Mod: JZ,SE | Performed by: STUDENT IN AN ORGANIZED HEALTH CARE EDUCATION/TRAINING PROGRAM

## 2025-06-28 PROCEDURE — 2500000004 HC RX 250 GENERAL PHARMACY W/ HCPCS (ALT 636 FOR OP/ED): Mod: JW,SE

## 2025-06-28 PROCEDURE — 86780 TREPONEMA PALLIDUM: CPT

## 2025-06-28 RX ORDER — SODIUM CHLORIDE, SODIUM LACTATE, POTASSIUM CHLORIDE, CALCIUM CHLORIDE 600; 310; 30; 20 MG/100ML; MG/100ML; MG/100ML; MG/100ML
75 INJECTION, SOLUTION INTRAVENOUS CONTINUOUS
Status: DISCONTINUED | OUTPATIENT
Start: 2025-06-28 | End: 2025-06-29

## 2025-06-28 RX ORDER — LIDOCAINE HYDROCHLORIDE 10 MG/ML
20 INJECTION, SOLUTION INFILTRATION; PERINEURAL ONCE AS NEEDED
Status: DISCONTINUED | OUTPATIENT
Start: 2025-06-28 | End: 2025-06-29 | Stop reason: HOSPADM

## 2025-06-28 RX ORDER — LABETALOL HYDROCHLORIDE 5 MG/ML
20 INJECTION, SOLUTION INTRAVENOUS ONCE AS NEEDED
Status: DISCONTINUED | OUTPATIENT
Start: 2025-06-28 | End: 2025-06-29 | Stop reason: HOSPADM

## 2025-06-28 RX ORDER — ONDANSETRON HYDROCHLORIDE 2 MG/ML
4 INJECTION, SOLUTION INTRAVENOUS EVERY 6 HOURS PRN
Status: DISCONTINUED | OUTPATIENT
Start: 2025-06-28 | End: 2025-06-29

## 2025-06-28 RX ORDER — FENTANYL/ROPIVACAINE/NS/PF 2MCG/ML-.2
0-25 PLASTIC BAG, INJECTION (ML) INJECTION CONTINUOUS
Refills: 0 | Status: DISCONTINUED | OUTPATIENT
Start: 2025-06-28 | End: 2025-06-29

## 2025-06-28 RX ORDER — TRANEXAMIC ACID 1 G/10ML
1000 INJECTION, SOLUTION INTRAVENOUS ONCE AS NEEDED
Status: DISCONTINUED | OUTPATIENT
Start: 2025-06-28 | End: 2025-06-29 | Stop reason: HOSPADM

## 2025-06-28 RX ORDER — LOPERAMIDE HYDROCHLORIDE 2 MG/1
4 CAPSULE ORAL EVERY 2 HOUR PRN
Status: DISCONTINUED | OUTPATIENT
Start: 2025-06-28 | End: 2025-06-29 | Stop reason: HOSPADM

## 2025-06-28 RX ORDER — OXYTOCIN 10 [USP'U]/ML
10 INJECTION, SOLUTION INTRAMUSCULAR; INTRAVENOUS ONCE AS NEEDED
Status: DISCONTINUED | OUTPATIENT
Start: 2025-06-28 | End: 2025-06-29 | Stop reason: HOSPADM

## 2025-06-28 RX ORDER — OXYTOCIN/0.9 % SODIUM CHLORIDE 30/500 ML
60 PLASTIC BAG, INJECTION (ML) INTRAVENOUS ONCE AS NEEDED
Status: COMPLETED | OUTPATIENT
Start: 2025-06-28 | End: 2025-06-29

## 2025-06-28 RX ORDER — TERBUTALINE SULFATE 1 MG/ML
0.25 INJECTION SUBCUTANEOUS ONCE AS NEEDED
Status: DISCONTINUED | OUTPATIENT
Start: 2025-06-28 | End: 2025-06-29 | Stop reason: HOSPADM

## 2025-06-28 RX ORDER — METHYLERGONOVINE MALEATE 0.2 MG/ML
0.2 INJECTION INTRAVENOUS ONCE AS NEEDED
Status: DISCONTINUED | OUTPATIENT
Start: 2025-06-28 | End: 2025-06-29 | Stop reason: HOSPADM

## 2025-06-28 RX ORDER — LIDOCAINE HCL/EPINEPHRINE/PF 2%-1:200K
VIAL (ML) INJECTION AS NEEDED
Status: DISCONTINUED | OUTPATIENT
Start: 2025-06-28 | End: 2025-06-29

## 2025-06-28 RX ORDER — ONDANSETRON 4 MG/1
4 TABLET, FILM COATED ORAL EVERY 6 HOURS PRN
Status: DISCONTINUED | OUTPATIENT
Start: 2025-06-28 | End: 2025-06-29

## 2025-06-28 RX ORDER — PENICILLIN G 3000000 [IU]/50ML
3 INJECTION, SOLUTION INTRAVENOUS EVERY 4 HOURS
Status: DISCONTINUED | OUTPATIENT
Start: 2025-06-29 | End: 2025-06-29 | Stop reason: HOSPADM

## 2025-06-28 RX ORDER — CARBOPROST TROMETHAMINE 250 UG/ML
250 INJECTION, SOLUTION INTRAMUSCULAR ONCE AS NEEDED
Status: DISCONTINUED | OUTPATIENT
Start: 2025-06-28 | End: 2025-06-29 | Stop reason: HOSPADM

## 2025-06-28 RX ORDER — HYDRALAZINE HYDROCHLORIDE 20 MG/ML
5 INJECTION INTRAMUSCULAR; INTRAVENOUS ONCE AS NEEDED
Status: DISCONTINUED | OUTPATIENT
Start: 2025-06-28 | End: 2025-06-29 | Stop reason: HOSPADM

## 2025-06-28 RX ORDER — OXYTOCIN/0.9 % SODIUM CHLORIDE 30/500 ML
60 PLASTIC BAG, INJECTION (ML) INTRAVENOUS ONCE AS NEEDED
Status: DISCONTINUED | OUTPATIENT
Start: 2025-06-28 | End: 2025-06-29 | Stop reason: HOSPADM

## 2025-06-28 RX ORDER — MISOPROSTOL 200 UG/1
800 TABLET ORAL ONCE AS NEEDED
Status: DISCONTINUED | OUTPATIENT
Start: 2025-06-28 | End: 2025-06-29 | Stop reason: HOSPADM

## 2025-06-28 RX ORDER — CALCIUM CARBONATE 200(500)MG
1 TABLET,CHEWABLE ORAL EVERY 6 HOURS PRN
Status: DISCONTINUED | OUTPATIENT
Start: 2025-06-28 | End: 2025-06-29

## 2025-06-28 RX ADMIN — SODIUM CHLORIDE, POTASSIUM CHLORIDE, SODIUM LACTATE AND CALCIUM CHLORIDE 75 ML/HR: 600; 310; 30; 20 INJECTION, SOLUTION INTRAVENOUS at 23:00

## 2025-06-28 RX ADMIN — Medication 5 ML: at 23:43

## 2025-06-28 RX ADMIN — Medication 10 ML/HR: at 23:44

## 2025-06-28 RX ADMIN — LIDOCAINE HYDROCHLORIDE,EPINEPHRINE BITARTRATE 3 ML: 20; .005 INJECTION, SOLUTION EPIDURAL; INFILTRATION; INTRACAUDAL; PERINEURAL at 23:40

## 2025-06-28 RX ADMIN — SODIUM CHLORIDE, SODIUM LACTATE, POTASSIUM CHLORIDE, AND CALCIUM CHLORIDE 500 ML: .6; .31; .03; .02 INJECTION, SOLUTION INTRAVENOUS at 23:00

## 2025-06-28 SDOH — HEALTH STABILITY: MENTAL HEALTH: HOW OFTEN DO YOU HAVE SIX OR MORE DRINKS ON ONE OCCASION?: NEVER

## 2025-06-28 SDOH — SOCIAL STABILITY: SOCIAL INSECURITY: DO YOU FEEL ANYONE HAS EXPLOITED OR TAKEN ADVANTAGE OF YOU FINANCIALLY OR OF YOUR PERSONAL PROPERTY?: NO

## 2025-06-28 SDOH — ECONOMIC STABILITY: FOOD INSECURITY: WITHIN THE PAST 12 MONTHS, YOU WORRIED THAT YOUR FOOD WOULD RUN OUT BEFORE YOU GOT THE MONEY TO BUY MORE.: NEVER TRUE

## 2025-06-28 SDOH — HEALTH STABILITY: MENTAL HEALTH: NON-SPECIFIC ACTIVE SUICIDAL THOUGHTS (PAST 1 MONTH): NO

## 2025-06-28 SDOH — SOCIAL STABILITY: SOCIAL INSECURITY: ARE YOU OR HAVE YOU BEEN THREATENED OR ABUSED PHYSICALLY, EMOTIONALLY, OR SEXUALLY BY ANYONE?: NO

## 2025-06-28 SDOH — HEALTH STABILITY: MENTAL HEALTH: WERE YOU ABLE TO COMPLETE ALL THE BEHAVIORAL HEALTH SCREENINGS?: YES

## 2025-06-28 SDOH — SOCIAL STABILITY: SOCIAL INSECURITY
WITHIN THE LAST YEAR, HAVE YOU BEEN KICKED, HIT, SLAPPED, OR OTHERWISE PHYSICALLY HURT BY YOUR PARTNER OR EX-PARTNER?: NO

## 2025-06-28 SDOH — HEALTH STABILITY: MENTAL HEALTH: HOW MANY DRINKS CONTAINING ALCOHOL DO YOU HAVE ON A TYPICAL DAY WHEN YOU ARE DRINKING?: PATIENT DOES NOT DRINK

## 2025-06-28 SDOH — HEALTH STABILITY: MENTAL HEALTH: WISH TO BE DEAD (PAST 1 MONTH): NO

## 2025-06-28 SDOH — ECONOMIC STABILITY: FOOD INSECURITY: HOW HARD IS IT FOR YOU TO PAY FOR THE VERY BASICS LIKE FOOD, HOUSING, MEDICAL CARE, AND HEATING?: NOT HARD AT ALL

## 2025-06-28 SDOH — HEALTH STABILITY: MENTAL HEALTH: SUICIDAL BEHAVIOR (LIFETIME): NO

## 2025-06-28 SDOH — ECONOMIC STABILITY: HOUSING INSECURITY: DO YOU FEEL UNSAFE GOING BACK TO THE PLACE WHERE YOU ARE LIVING?: NO

## 2025-06-28 SDOH — ECONOMIC STABILITY: FOOD INSECURITY: WITHIN THE PAST 12 MONTHS, THE FOOD YOU BOUGHT JUST DIDN'T LAST AND YOU DIDN'T HAVE MONEY TO GET MORE.: NEVER TRUE

## 2025-06-28 SDOH — SOCIAL STABILITY: SOCIAL INSECURITY: WITHIN THE LAST YEAR, HAVE YOU BEEN HUMILIATED OR EMOTIONALLY ABUSED IN OTHER WAYS BY YOUR PARTNER OR EX-PARTNER?: NO

## 2025-06-28 SDOH — SOCIAL STABILITY: SOCIAL INSECURITY: HAVE YOU HAD THOUGHTS OF HARMING ANYONE ELSE?: NO

## 2025-06-28 SDOH — HEALTH STABILITY: MENTAL HEALTH: HOW OFTEN DO YOU HAVE A DRINK CONTAINING ALCOHOL?: NEVER

## 2025-06-28 SDOH — SOCIAL STABILITY: SOCIAL INSECURITY
WITHIN THE LAST YEAR, HAVE YOU BEEN RAPED OR FORCED TO HAVE ANY KIND OF SEXUAL ACTIVITY BY YOUR PARTNER OR EX-PARTNER?: NO

## 2025-06-28 SDOH — SOCIAL STABILITY: SOCIAL INSECURITY: ARE THERE ANY APPARENT SIGNS OF INJURIES/BEHAVIORS THAT COULD BE RELATED TO ABUSE/NEGLECT?: NO

## 2025-06-28 SDOH — SOCIAL STABILITY: SOCIAL INSECURITY: HAS ANYONE EVER THREATENED TO HURT YOUR FAMILY OR YOUR PETS?: NO

## 2025-06-28 SDOH — SOCIAL STABILITY: SOCIAL INSECURITY: WITHIN THE LAST YEAR, HAVE YOU BEEN AFRAID OF YOUR PARTNER OR EX-PARTNER?: NO

## 2025-06-28 SDOH — SOCIAL STABILITY: SOCIAL INSECURITY: VERBAL ABUSE: DENIES

## 2025-06-28 SDOH — SOCIAL STABILITY: SOCIAL INSECURITY: DOES ANYONE TRY TO KEEP YOU FROM HAVING/CONTACTING OTHER FRIENDS OR DOING THINGS OUTSIDE YOUR HOME?: NO

## 2025-06-28 SDOH — SOCIAL STABILITY: SOCIAL INSECURITY: ABUSE SCREEN: ADULT

## 2025-06-28 SDOH — HEALTH STABILITY: MENTAL HEALTH: CURRENT SMOKER: 0

## 2025-06-28 SDOH — SOCIAL STABILITY: SOCIAL INSECURITY: HAVE YOU HAD ANY THOUGHTS OF HARMING ANYONE ELSE?: NO

## 2025-06-28 SDOH — SOCIAL STABILITY: SOCIAL INSECURITY: PHYSICAL ABUSE: DENIES

## 2025-06-28 SDOH — ECONOMIC STABILITY: TRANSPORTATION INSECURITY: IN THE PAST 12 MONTHS, HAS LACK OF TRANSPORTATION KEPT YOU FROM MEDICAL APPOINTMENTS OR FROM GETTING MEDICATIONS?: NO

## 2025-06-28 ASSESSMENT — PAIN SCALES - GENERAL
PAINLEVEL_OUTOF10: 5 - MODERATE PAIN
PAINLEVEL_OUTOF10: 5 - MODERATE PAIN

## 2025-06-28 ASSESSMENT — LIFESTYLE VARIABLES
SKIP TO QUESTIONS 9-10: 1
AUDIT-C TOTAL SCORE: 0
AUDIT-C TOTAL SCORE: 0
HOW OFTEN DO YOU HAVE 6 OR MORE DRINKS ON ONE OCCASION: NEVER
AUDIT-C TOTAL SCORE: 0
HOW MANY STANDARD DRINKS CONTAINING ALCOHOL DO YOU HAVE ON A TYPICAL DAY: PATIENT DOES NOT DRINK
HOW OFTEN DO YOU HAVE A DRINK CONTAINING ALCOHOL: NEVER
HOW OFTEN DO YOU HAVE 6 OR MORE DRINKS ON ONE OCCASION: NEVER
HOW MANY STANDARD DRINKS CONTAINING ALCOHOL DO YOU HAVE ON A TYPICAL DAY: PATIENT DOES NOT DRINK
AUDIT-C TOTAL SCORE: 0
SKIP TO QUESTIONS 9-10: 1
AUDIT-C TOTAL SCORE: 0
SKIP TO QUESTIONS 9-10: 1
HOW OFTEN DO YOU HAVE A DRINK CONTAINING ALCOHOL: NEVER

## 2025-06-28 ASSESSMENT — PATIENT HEALTH QUESTIONNAIRE - PHQ9
1. LITTLE INTEREST OR PLEASURE IN DOING THINGS: NOT AT ALL
SUM OF ALL RESPONSES TO PHQ9 QUESTIONS 1 & 2: 0
1. LITTLE INTEREST OR PLEASURE IN DOING THINGS: NOT AT ALL
2. FEELING DOWN, DEPRESSED OR HOPELESS: NOT AT ALL
SUM OF ALL RESPONSES TO PHQ9 QUESTIONS 1 & 2: 0
2. FEELING DOWN, DEPRESSED OR HOPELESS: NOT AT ALL

## 2025-06-28 ASSESSMENT — ACTIVITIES OF DAILY LIVING (ADL): LACK_OF_TRANSPORTATION: NO

## 2025-06-29 VITALS
HEART RATE: 75 BPM | RESPIRATION RATE: 16 BRPM | OXYGEN SATURATION: 95 % | SYSTOLIC BLOOD PRESSURE: 106 MMHG | HEIGHT: 66 IN | WEIGHT: 182.98 LBS | DIASTOLIC BLOOD PRESSURE: 68 MMHG | TEMPERATURE: 98.1 F | BODY MASS INDEX: 29.41 KG/M2

## 2025-06-29 LAB
ABO GROUP (TYPE) IN BLOOD: NORMAL
ANTIBODY SCREEN: NORMAL
RH FACTOR (ANTIGEN D): NORMAL
TREPONEMA PALLIDUM IGG+IGM AB [PRESENCE] IN SERUM OR PLASMA BY IMMUNOASSAY: NONREACTIVE

## 2025-06-29 PROCEDURE — 51701 INSERT BLADDER CATHETER: CPT

## 2025-06-29 PROCEDURE — 59409 OBSTETRICAL CARE: CPT

## 2025-06-29 PROCEDURE — 7100000016 HC LABOR RECOVERY PER HOUR

## 2025-06-29 PROCEDURE — 1210000001 HC SEMI-PRIVATE ROOM DAILY

## 2025-06-29 PROCEDURE — 2500000001 HC RX 250 WO HCPCS SELF ADMINISTERED DRUGS (ALT 637 FOR MEDICARE OP): Mod: SE

## 2025-06-29 PROCEDURE — 2500000004 HC RX 250 GENERAL PHARMACY W/ HCPCS (ALT 636 FOR OP/ED): Mod: JZ,SE

## 2025-06-29 PROCEDURE — 7210000002 HC LABOR PER HOUR

## 2025-06-29 RX ORDER — DIPHENHYDRAMINE HYDROCHLORIDE 50 MG/ML
25 INJECTION, SOLUTION INTRAMUSCULAR; INTRAVENOUS EVERY 6 HOURS PRN
Status: DISCONTINUED | OUTPATIENT
Start: 2025-06-29 | End: 2025-07-02 | Stop reason: HOSPADM

## 2025-06-29 RX ORDER — ADHESIVE BANDAGE
10 BANDAGE TOPICAL
Status: DISCONTINUED | OUTPATIENT
Start: 2025-06-29 | End: 2025-07-02 | Stop reason: HOSPADM

## 2025-06-29 RX ORDER — HYDRALAZINE HYDROCHLORIDE 20 MG/ML
5 INJECTION INTRAMUSCULAR; INTRAVENOUS ONCE AS NEEDED
Status: DISCONTINUED | OUTPATIENT
Start: 2025-06-29 | End: 2025-07-02 | Stop reason: HOSPADM

## 2025-06-29 RX ORDER — IBUPROFEN 600 MG/1
600 TABLET, FILM COATED ORAL EVERY 6 HOURS
Status: DISCONTINUED | OUTPATIENT
Start: 2025-06-29 | End: 2025-07-02 | Stop reason: HOSPADM

## 2025-06-29 RX ORDER — ACETAMINOPHEN 325 MG/1
975 TABLET ORAL EVERY 6 HOURS
Status: DISCONTINUED | OUTPATIENT
Start: 2025-06-29 | End: 2025-07-02 | Stop reason: HOSPADM

## 2025-06-29 RX ORDER — OXYTOCIN/0.9 % SODIUM CHLORIDE 30/500 ML
60 PLASTIC BAG, INJECTION (ML) INTRAVENOUS ONCE AS NEEDED
Status: DISCONTINUED | OUTPATIENT
Start: 2025-06-29 | End: 2025-07-02 | Stop reason: HOSPADM

## 2025-06-29 RX ORDER — SIMETHICONE 80 MG
80 TABLET,CHEWABLE ORAL 4 TIMES DAILY PRN
Status: DISCONTINUED | OUTPATIENT
Start: 2025-06-29 | End: 2025-07-02 | Stop reason: HOSPADM

## 2025-06-29 RX ORDER — LOPERAMIDE HYDROCHLORIDE 2 MG/1
4 CAPSULE ORAL EVERY 2 HOUR PRN
Status: DISCONTINUED | OUTPATIENT
Start: 2025-06-29 | End: 2025-07-02 | Stop reason: HOSPADM

## 2025-06-29 RX ORDER — ONDANSETRON HYDROCHLORIDE 2 MG/ML
4 INJECTION, SOLUTION INTRAVENOUS EVERY 6 HOURS PRN
Status: DISCONTINUED | OUTPATIENT
Start: 2025-06-29 | End: 2025-07-02 | Stop reason: HOSPADM

## 2025-06-29 RX ORDER — POLYETHYLENE GLYCOL 3350 17 G/17G
17 POWDER, FOR SOLUTION ORAL 2 TIMES DAILY PRN
Status: DISCONTINUED | OUTPATIENT
Start: 2025-06-29 | End: 2025-07-02 | Stop reason: HOSPADM

## 2025-06-29 RX ORDER — DIPHENHYDRAMINE HCL 25 MG
25 CAPSULE ORAL EVERY 6 HOURS PRN
Status: DISCONTINUED | OUTPATIENT
Start: 2025-06-29 | End: 2025-07-02 | Stop reason: HOSPADM

## 2025-06-29 RX ORDER — LABETALOL HYDROCHLORIDE 5 MG/ML
20 INJECTION, SOLUTION INTRAVENOUS ONCE AS NEEDED
Status: DISCONTINUED | OUTPATIENT
Start: 2025-06-29 | End: 2025-07-02 | Stop reason: HOSPADM

## 2025-06-29 RX ORDER — MISOPROSTOL 200 UG/1
800 TABLET ORAL ONCE AS NEEDED
Status: DISCONTINUED | OUTPATIENT
Start: 2025-06-29 | End: 2025-07-02 | Stop reason: HOSPADM

## 2025-06-29 RX ORDER — METHYLERGONOVINE MALEATE 0.2 MG/ML
0.2 INJECTION INTRAVENOUS ONCE AS NEEDED
Status: DISCONTINUED | OUTPATIENT
Start: 2025-06-29 | End: 2025-07-02 | Stop reason: HOSPADM

## 2025-06-29 RX ORDER — ONDANSETRON 4 MG/1
4 TABLET, FILM COATED ORAL EVERY 6 HOURS PRN
Status: DISCONTINUED | OUTPATIENT
Start: 2025-06-29 | End: 2025-07-02 | Stop reason: HOSPADM

## 2025-06-29 RX ORDER — CARBOPROST TROMETHAMINE 250 UG/ML
250 INJECTION, SOLUTION INTRAMUSCULAR ONCE AS NEEDED
Status: DISCONTINUED | OUTPATIENT
Start: 2025-06-29 | End: 2025-07-02 | Stop reason: HOSPADM

## 2025-06-29 RX ORDER — OXYTOCIN 10 [USP'U]/ML
10 INJECTION, SOLUTION INTRAMUSCULAR; INTRAVENOUS ONCE AS NEEDED
Status: DISCONTINUED | OUTPATIENT
Start: 2025-06-29 | End: 2025-07-02 | Stop reason: HOSPADM

## 2025-06-29 RX ORDER — TRANEXAMIC ACID 1 G/10ML
1000 INJECTION, SOLUTION INTRAVENOUS ONCE AS NEEDED
Status: ACTIVE | OUTPATIENT
Start: 2025-06-29 | End: 2025-07-02

## 2025-06-29 RX ADMIN — ACETAMINOPHEN 975 MG: 325 TABLET ORAL at 12:46

## 2025-06-29 RX ADMIN — IBUPROFEN 600 MG: 600 TABLET ORAL at 12:46

## 2025-06-29 RX ADMIN — IBUPROFEN 600 MG: 600 TABLET ORAL at 06:55

## 2025-06-29 RX ADMIN — Medication 60 MILLI-UNITS/MIN: at 03:17

## 2025-06-29 RX ADMIN — ACETAMINOPHEN 975 MG: 325 TABLET ORAL at 18:35

## 2025-06-29 RX ADMIN — ACETAMINOPHEN 975 MG: 325 TABLET ORAL at 06:55

## 2025-06-29 RX ADMIN — PENICILLIN G POTASSIUM 5 MILLION UNITS: 5000000 INJECTION, POWDER, FOR SOLUTION INTRAMUSCULAR; INTRAVENOUS at 01:11

## 2025-06-29 RX ADMIN — IBUPROFEN 600 MG: 600 TABLET ORAL at 18:35

## 2025-06-29 ASSESSMENT — PAIN SCALES - GENERAL
PAINLEVEL_OUTOF10: 0 - NO PAIN
PAINLEVEL_OUTOF10: 2
PAINLEVEL_OUTOF10: 2
PAINLEVEL_OUTOF10: 0 - NO PAIN
PAINLEVEL_OUTOF10: 2
PAINLEVEL_OUTOF10: 2
PAINLEVEL_OUTOF10: 0 - NO PAIN

## 2025-06-29 ASSESSMENT — PAIN DESCRIPTION - DESCRIPTORS
DESCRIPTORS: DISCOMFORT
DESCRIPTORS: DISCOMFORT
DESCRIPTORS: SORE

## 2025-06-29 NOTE — CARE PLAN
The patient's goals for the shift include rest    The clinical goals for the shift include light to scant lochia      Problem: Postpartum  Goal: Experiences normal postpartum course  Outcome: Progressing  Goal: Appropriate maternal -  bonding  Outcome: Progressing     Problem: Pain - Adult  Goal: Verbalizes/displays adequate comfort level or baseline comfort level  Outcome: Progressing     Problem: Safety - Adult  Goal: Free from fall injury  Outcome: Progressing     Patient has had stable VS and assessments, pain well controlled and bleeding has been appropriate during this shift.

## 2025-06-29 NOTE — SIGNIFICANT EVENT
Called to bedside by RN, Marcella feeling more pressure and would like to be checked.     SVE 10/100/+1, thin, red blood noted on glove and linens  FHT Cat 1  [place] q2-3 mins  GBS pos, received PCN x1    Patient requests to labor down for 20 mins to allow for fetal descent  Discussed that should bleeding increase heavily or if there are signs of fetal distress, will plan to begin pushing at that time. Pt verbalizes understanding.  Continue to monitor maternal/fetal status  Anticipate     Melissa L Zahorsky, DEJA-ROSA

## 2025-06-29 NOTE — L&D DELIVERY NOTE
Vaginal Delivery Note    Patient Name: Marcella Lu  : 1995  MRN: [id number]  Age: 29 y.o.    /Para:   Gestational Age: 38w4d    Date of Delivery: 2025    Procedure: Normal Spontaneous Vaginal Delivery    Delivery Provider: Melissa L Zahorsky, APRN-CNM      Resident/Fellow/Other Assistant: None    Description of Procedure:  Marcella Lu, a 29 y.o.  female delivered a viable Female infant with Apgars of 8  and 9 . Patient was fully dilated and pushing after 2 hours 13 minutes in labor. The patient was assisted into the dorsal lithotomy position per patient preference. Patient pushing with good effort. Head delivered, restituted to NILDA. Shoulders delivered with gentle downward traction following restitution. Postpartum pitocin bolus initiated immediately after birth. Infant placed on maternal abdomen for skin to skin. Cord clamped and cut by patient after 2 min delay. 3VC noted. Placenta delivered spontaneously with gentle downward traction and maternal pushing efforts at 2025  4:05 AM. Fundal massage performed. Fundus palpated firm and midline.     Perineum, vulva, vagina, and cervix were inspected. No vaginal laceration or abrasions noted. . Infant and patient in delivery room in good and stable condition.     Additional Procedures:  None    Findings:   Amniotic fluid Clear, Female infant in Vertex Occiput Anterior presentation, APGARS 8 , 9 .  Birth Weight 3.3 kg.    Complications: None    Quantitative Blood Loss:   Delivery QBL: 255 mL (2025  3:53 AM - 2025  7:05 AM)    Blood products:      Uterotonics/Hemostatic Agent: IV Pitocin 30 units    Specimen:   Placenta  Delivered: 2025  4:05 AM  Appearance: Intact  Removal: Expressed    Disposition: discarded    Sponge/Instrument/Needle Counts: The sponge, lap and needle counts were correct.    Patient Disposition: Patient recovering on labor and delivery in stable condition.    Ramila Lu  [[id number]]      Labor Events    Rupture date/time: 2025 2200  Rupture type: Spontaneous  Fluid color: Clear  Fluid odor: None  Labor type: Spontaneous Onset of Labor  Labor allowed to proceed with plans for an attempted vaginal birth?: Yes  Augmentation: None  Complications: None       Labor Event Times    Labor onset date/time: 2025 0100  Dilation complete date/time: 2025 0313  Start pushing date/time: 2025 03:45       Labor Length    1st stage: 2h 13m  2nd stage: 0h 40m  3rd stage: 0h 12m       Placenta    Placenta delivery date/time: 2025 04:05  Placenta removal: Expressed  Placenta appearance: Intact  Placenta disposition: discarded       Cord    Vessels: 2 vessels  Complications: None  Delayed cord clamping?: Yes  Cord clamped date/time: 2025 03:53:00  Cord blood disposition: Refrigerator  Gases sent?: No  Stem cell collection (by provider): No       Lacerations    Episiotomy: None  Perineal laceration: None  Other lacerations?: No  Repair suture: None       Anesthesia    Method: Epidural       Operative Delivery    Forceps attempted?: No  Vacuum extractor attempted?: No       Shoulder Dystocia    Shoulder dystocia present?: No       Harrison Delivery    Time head delivered: 2025 03:53:00  Birth date/time: 2025 03:53:00  Delivery type: Vaginal, Spontaneous  Complications: None       Resuscitation    Method: Suctioning       Apgars    Living status: Living  Apgar Component Scores:  1 min.:  5 min.:  10 min.:  15 min.:  20 min.:    Skin color:  0  1       Heart rate:  2  2       Reflex irritability:  2  2       Muscle tone:  2  2       Respiratory effort:  2  2       Total:  8  9       Apgars assigned by: JHOANA CAMACHO       Delivery Providers    Delivering clinician: Melissa L Zahorsky, APRN-CHANDRAM   Provider Role    Ya Barrios RN Delivery Nurse    Laurie Hayden RN Nursery Nurse     Resident

## 2025-06-29 NOTE — ANESTHESIA PREPROCEDURE EVALUATION
Patient: Marcella Lu    Evaluation Method: In-person visit    Procedure Information    Date: 06/28/25  Procedure: Labor Consult         Relevant Problems   Anesthesia (within normal limits)      Cardiac (within normal limits)      Pulmonary (within normal limits)      Neuro (within normal limits)      GI (within normal limits)      /Renal (within normal limits)      Liver (within normal limits)      Endocrine (within normal limits)      ID   (+) Mother positive for group B Streptococcus colonization      GYN   (+) 38 weeks gestation of pregnancy ([facility])       Clinical information reviewed:   Tobacco  Allergies  Meds   Med Hx  Surg Hx   Fam Hx  Soc Hx        NPO Detail:  No data recorded     OB/Gyn Evaluation    Present Pregnancy    Patient is pregnant now.   Obstetric History    (+) other significant obstetric history (prior molar pregnancy)              Physical Exam    Airway  Mallampati: I  TM distance: >3 FB  Neck ROM: full  Mouth opening: 3 or more finger widths     Cardiovascular   Rhythm: regular  Rate: normal     Dental - normal exam     Pulmonary Breath sounds clear to auscultation     Abdominal            Anesthesia Plan    History of general anesthesia?: yes  History of complications of general anesthesia?: no    ASA 2     epidural   (Discussed risks and benefits of epidural. All questions were answered. )  The patient is not a current smoker.  Patient was not previously instructed to abstain from smoking on day of procedure.  Patient did not smoke on day of procedure.    Anesthetic plan and risks discussed with patient.  Use of blood products discussed with patient who consented to blood products.    Plan discussed with resident and attending.

## 2025-06-29 NOTE — H&P
OB Admission H&P    Assessment/Plan    Marcella Lu is a 29 y.o.  at 38w3d, OSCAR: 2025, by 6w Ultrasound, who is admitted for Labor.    Plan   -Admit to L&D, consented  -T&S, CBC, and Syphilis  -Epidural at patient request  -Recheck as clinically indicated by maternal or fetal status    Labor  ROM   -Pt with ctx q3 min   -SROM on presentation to triage at 2200   - Nitrazine, pooling positive, ferning negative   -Admit for labor, if making change no need to augment at this time     Fetal Status  -NST reactive, reassuring   -Presentation vertex based on ultrasound  -EFW 6.5# by [place]  -GBS pos, PCN ordered    Postpartum  Contraception Plan: interval paraguard    Maternal Comorbidities  -hx of molar pregnancy, for placental pathology  -PCOS previously on metformin, no need to continue treatment on L&D no hx of diabetes. A1c and one hour ogtt wnl      Patient seen and discussed with Dr. Josafat Palmer MD PGY-2     Pregnancy Problems (from 24 to present)       Problem Noted Diagnosed Resolved    Mother positive for group B Streptococcus colonization 2025 by Adrienne Brandon MD  No    Priority:  Medium       Overview Signed 2025  1:39 PM by Adrienne Brandon MD   - Plan for antibiotics in labor         Maternal excessive weight gain, third trimester ([facility]) 2025 by Adrienne Brandon MD  No    Priority:  Medium       Overview Addendum 2025  1:48 PM by Ginny Thomas MD   - Plan 30 week growth US (EFW 37%tile at 31w5d), scheduled for 37 week ultrasound         History of molar pregnancy in first trimester, antepartum ([facility]) 2024 by Adrienne Brandon MD  No    Priority:  Medium       Overview Signed 2025  3:16 PM by Adrienne Brandon MD   - Plan for placental path after delivery         38 weeks gestation of pregnancy ([facility]) 2024 by Adrienne Brandon MD  No    Priority:  Medium       Overview Addendum 2025  1:39 PM by  Adrienne Brandon MD   Desired provider in labor: [] CNM  [] Physician  [x] Blood Products: [x] Yes, accepts [] No, needs counseling  [x] Initial BMI: Could not be calculated   [x] Prenatal Labs: Normal  [x] Cervical Cancer Screening up to date  [x] Rh status: pos  [x] Genetic Screening: normal  [x] NT US: (11-13 wks) result pending  [x] Baby ASA (if indicated):  [x] Pregnancy dated by: 6 week US    [x] Anatomy US: (19-20 wks)  [x] Federal Sterilization consent signed (if indicated): N/A  [x] 1hr GCT at 24-28wks:  [x] Rhogam (if indicated): N/A  [] Fetal Surveillance (if indicated):  [x] Tdap (27-32 wks, may be given up to 36 wks if initial window missed):   [x] RSV (32-36 wks) (Sept. to end of ): N/A  [] Flu Vaccine:    [x] Breastfeeding: received pump, momcozy  [x] Postpartum Birth control method: Interval ParaGard  [x] GBS at 36 - 37 wks: positive  [x] 39 weeks discussion of IOL vs. Expectant management: Expectant  [x] Mode of delivery ( anticipated ):           Increased urinary frequency during pregnancy ([facility]) 2025 by Adrienne Brandon MD  2025 by Adrienne Brandon MD            Dave Lu is a 29 y.o.  at 38w3d, OSCAR: 2025, by 6w Ultrasound, who is admitted for Labor.    Doing well. Baby moving okay no vaginal bleeding. Had a gush of fluid in triage at 2200 and has been soaking pads ever since. Ctx q2-3 minutes at home that have been strong. Currently standing next to the side of the bed breathing through her ctx. No other symptoms or concerns at this time. Membranes were swept on Wednesday. Previous delivery term without complications.     Pregnancy notables   Hx molar pregnancy, for placental path   PCOS on metformin, A1c and one hour gtt wnl no hx diabetes   Personal hx of  cyst on neck s/p multiple surgeries, did not include thyroid   GBS pos    Prenatal Provider Roma    OB History    Para Term  AB Living   3 1 1 0 1 1    SAB IAB Ectopic Multiple Live Births   0 0 0 0 1      # Outcome Date GA Lbr Georges/2nd Weight Sex Type Anes PTL Lv   3 Current            2 Term 07/2021 39w0d  3.402 kg M Vag-Spont EPI  MIKE   1 Molar      Incomplete S          Surgical History[1]    Social History     Tobacco Use    Smoking status: Never    Smokeless tobacco: Never   Substance Use Topics    Alcohol use: Never       Allergies[2]    Prescriptions Prior to Admission[3]  Objective     Last Vitals  Temp Pulse Resp BP MAP O2 Sat   36.2 °C (97.2 °F) 98 20 128/84 101 100 %     Blood Pressures         6/28/2025  2138             BP: 128/84             Physical Exam  General: NAD, mood appropriate  Cardiopulmonary: warm and well perfused, breathing comfortably on room air  Abdomen: Gravid, non-tender  Extremities: Symmetric  Speculum Exam: pooled fluid appearing clear, Nitrazine test is positive, Ferning test is negative  Cervix: 4 /60 /-2     Chaperone Present: Yes.  Chaperone Name/Title: Radha Lara RN  Examination Chaperoned: Gynecological Exam     Fetal Monitoring  Baseline: 150 bpm, Variability: moderate,  Accelerations: present and Decelerations: none  Uterine Activity: Contractions present, difficult to assess frequency on toco given pt moving but subjectively q2 min on evaluation  Interpretation: Reactive    Bedside ultrasound: Yes vertex    Labs in chart were reviewed.   Admission labs pending, last Hb 11.8 on 3/21    Prenatal labs reviewed, not remarkable.       [1]   Past Surgical History:  Procedure Laterality Date    OTHER SURGICAL HISTORY  10/19/2020    No history of surgery   [2]   Allergies  Allergen Reactions    Pineapple Anaphylaxis    Nickel Itching, Rash and Swelling    Nitrofurantoin Monohyd/M-Cryst Hives, Itching and Rash    Trace Metals Itching and Rash     Nickel   [3]   Medications Prior to Admission   Medication Sig Dispense Refill Last Dose/Taking    aspirin 81 mg EC tablet Take 2 tablets (162 mg) by mouth once daily. Start  taking at 12 weeks and continue until you have your baby 180 tablet 3 Past Week    -iron fum-folic acid (Prenatal 19) 29 mg iron- 1 mg tablet Take 1 tablet by mouth once daily. 90 tablet 3 6/27/2025    clindamycin phosphate (Clindagel) 1 % gel, once daily Apply 1 g topically once daily. 75 mL 1     metFORMIN (Glucophage) 500 mg tablet TAKE 1 TABLET(500 MG) BY MOUTH EVERY DAY WITH A MEAL 30 tablet 11

## 2025-06-29 NOTE — ANESTHESIA PREPROCEDURE EVALUATION
Patient: Marcella Lu    Evaluation Method: In-person visit    Procedure Information    Date: 06/28/25  Procedure: Labor Consult         Relevant Problems   Anesthesia (within normal limits)      Cardiac (within normal limits)      Pulmonary (within normal limits)      Neuro (within normal limits)      GI (within normal limits)      /Renal (within normal limits)      Liver (within normal limits)      Endocrine (within normal limits)      ID   (+) Mother positive for group B Streptococcus colonization      GYN   (+) 38 weeks gestation of pregnancy ([facility])       Clinical information reviewed:                   NPO Detail:  No data recorded     OB/Gyn Evaluation    Present Pregnancy    Patient is pregnant now.   Obstetric History    (+) other significant obstetric history (prior molar pregnancy)          Physical Exam    Airway  Mallampati: I  TM distance: >3 FB  Neck ROM: full  Mouth opening: 3 or more finger widths     Cardiovascular   Rhythm: regular  Rate: normal     Dental - normal exam     Pulmonary Breath sounds clear to auscultation     Abdominal          Anesthesia Plan    History of general anesthesia?: yes  History of complications of general anesthesia?: no    ASA 2     epidural   (Discussed risks and benefits of epidural. All questions were answered. )  The patient is not a current smoker.  Patient was not previously instructed to abstain from smoking on day of procedure.  Patient did not smoke on day of procedure.    Anesthetic plan and risks discussed with patient.  Use of blood products discussed with patient who consented to blood products.    Plan discussed with resident and attending.

## 2025-06-29 NOTE — DISCHARGE INSTRUCTIONS

## 2025-06-29 NOTE — CARE PLAN
The patient's goals for the shift include  to have baby     The clinical goals for the shift include FHR to remain reasuring throughout labor    Problem: Vaginal Birth or  Section  Goal: Fetal and maternal status remain reassuring during the birth process  2025 by Ya Barrios RN  Outcome: Met  2025 by Ya Barrios RN  Outcome: Progressing  Goal: Tolerate CRB for IOL placement maintenance until dislodgement/removal 12hrs after placement  2025 by Ya Barrios RN  Outcome: Met  2025 by Ya Barrios RN  Outcome: Progressing  Goal: Prevention of malpresentation/labor dystocia through delivery  2025 by Ya Barrios RN  Outcome: Met  2025 by Ya Barrios RN  Outcome: Progressing  Goal: Demonstrates labor coping techniques through delivery  2025 by Ya Barrios RN  Outcome: Met  2025 by Ya Barrios RN  Outcome: Progressing  Goal: Minimal s/sx of HDP and BP<160/110  2025 by Ya Barrios RN  Outcome: Met  2025 by Ya Barrios RN  Outcome: Progressing  Goal: No s/sx of infection through recovery  2025 by Ya Barrios RN  Outcome: Met  2025 by Ya Barrios RN  Outcome: Progressing  Goal: No s/sx of hemorrhage through recovery  2025 by Ya Barrios RN  Outcome: Met  2025 by Ya Barrios RN  Outcome: Progressing

## 2025-06-29 NOTE — LACTATION NOTE
Lactation Consultant Note  Lactation Consultation  Reason for Consult: Initial assessment  Consultant Name: Lauren Dooley RN, IBCLC    Maternal Information  Has mother  before?: Yes  How long did the mother previously breastfeed?: exclusively  now almost 5 year old for 2 years  Previous Maternal Breastfeeding Challenges:  (lip/tongue tie (corrected), bottle refusal (drank from open cup starting at age 6 months))  Infant to breast within first 2 hours of birth?: Yes  Exclusive Pump and Bottle Feed: No    Maternal Assessment  Breast Assessment: Warm, Compressible, Soft (easily expressible bilaterally)  Nipple Assessment: Intact, Erect, Large diameter  Areola Assessment: Normal    Infant Assessment  Infant Behavior: Readiness to feed, Feeding cues observed, Rooting response  Infant Assessment: Good cupping of tongue, Able to elevate tongue to roof of mouth, Tongue protrudes over alveolar ridge    Feeding Assessment  Nutrition Source: Breastmilk  Feeding Method: Nursing at the breast  Feeding Position: Cross - cradle, Cradle, C - hold, Breast sandwich, Skin to skin, Mother needs assistance with latch/positioning  Suck/Feeding: Sustained, Baby led rhythmically, Coordinated suck/swallow/breathe, Audible swallowing  Latch Assessment: Lower lip turned in, Upper lip turned in, Flanged lips, Bursts of sucking, swallowing, and rest, Comfortable latch, Minimal assistance is needed, Shallow latch, Areolar attachment, Deep latch obtained, Optimal angle of mouth opening, Frequent audible swallows    LATCH TOOL  Latch: Grasps breast, tongue down, lips flanged, rhythmic sucking  Audible Swallowing: Spontaneous and intermittent (24 hours old)  Type of Nipple: Everted (After stimulation)  Comfort (Breast/Nipple): Soft/non-tender  Hold (Positioning): Minimal assist, teach one side, mother does other, staff holds  LATCH Score: 9    Breast Pump       Other OB Lactation Tools       Patient Follow-up  Outpatient  Lactation Follow-up: Recommended    Other OB Lactation Documentation  Maternal Risk Factors: Polycystic ovary syndrome  Infant Risk Factors: Early term birth 37-39 weeks    Recommendations/Summary  Upon entering the room, infant showing feeding cues laying in bassinet. Mother agreeable to me observing and assisting with latching infant at breast at this time. Per mother, has experience with exclusively breastfeeding first child for 2 years.     Placed infant with mother skin to skin and mother independently latched infant to left breast in a modified cradle hold. Mother has larger diameter nipples and I noted latch to be shallow with little areolar attachment. I suggested unlatching infant and assisted to re-latch deeper, mother agreeable. I helped to show mother how to hold infant in cross-cradle hold at least to initially latch, then how to bring infant's chin and lower lip to breast first so she can sandwich and aim nipple and areola into infant's wide open mouth. I also assisted by flanging upper and lower lips out more which mother states helped and created a comfortable latch. Infant remained latched well with areolar attachment, nose and chin touching breast, lips flanged, sucks with long jaw movement and audible swallows.      I reviewed with mother some typical  feeding patterns in the first 24 hours of life, feeding infant based on feeding cues with a goal of 8-12 times in a 24 hour period, waking infant if it has been 3 hours since last feed, feeding infant on first breast until she unlatches or until tactile stimulation is not keeping her sucking/swallowing at breast. I then recommended burping infant and then trying to latch/feed infant on other breast.     We also discussed characteristics and benefits of a deep and proper latch, how to move from cross-cradle hold to cradle hold, how pillows can help with support of infant up to the level of mother's breasts when feeding, and discussed option to  start pumping and introducing a bottle at home in about 3-4 weeks.    Mother has a breast pump for home use. Outpatient lactation resources discussed with mother. I encouraged mother to call for any questions, concerns or assistance.

## 2025-06-29 NOTE — PROGRESS NOTES
Assessment    29 y.o.  at 38w4d  FHT Category 1  Active labor  GBS pos  H/o molar pregnancy -> placenta to path  PCOS w/ metformin use; 1hr GCT WNL    Plan    Patient plans to rest now  Encourage frequent position changes as tolerated  PCN GBS prophylaxis  Discussed with patient to notify RN with increased rectal pressure or urge to push  Continue assessment of maternal and fetal wellbeing  Recheck as clinically indicated by maternal or fetal status  Anticipate second stage of labor  Anticipate NSVB    Melissa L Zahorsky, APRN-CNM    Subjective:  Assumed care of patient per patient's request for midwifery care. Marcella is lying right lateral in bed, comfortable with epidural. Is feeling a lot of pressure and requests to be checked. FOB and MIL at bedside.     She states she has been having uncomfortable cramping all day today but they became stronger and timeable around 2100.    Objective:  Fetal Monitoring      Baseline FHR: 150 per minute  Variability: moderate  Accelerations: yes  Decelerations: none  TOCO: Contraction Frequency: 2-3     Cervical Exam: 6 cm dilated, 90 effaced, 0 station    Membrane Status: SROM  Rupture Date: 25  Rupture Time: 2200  Fluid Color: Clear    Vitals:    25 0044 25 0048 25 0053 25 0058   BP: 106/70  102/65    Pulse: 85 91 86 91   Resp:       Temp:       TempSrc:       SpO2:  96% 96% 97%   Weight:       Height:

## 2025-06-29 NOTE — ANESTHESIA PROCEDURE NOTES
Epidural Block    Patient location during procedure: OB  Start time: 6/28/2025 11:29 PM  End time: 6/29/2025 12:01 AM  Reason for block: labor analgesia  Staffing  Performed: resident   Authorized by: Maggie Butler MD    Performed by: Kisha Cheung MD    Preanesthetic Checklist  Completed: patient identified, IV checked, risks and benefits discussed, surgical consent, pre-op evaluation, timeout performed and sterile techniques followed  Block Timeout  RN/Licensed healthcare professional reads aloud to the Anesthesia provider and entire team: Patient identity, procedure with side and site, patient position, and as applicable the availability of implants/special equipment/special requirements.  Patient on coagulant treatment: no  Timeout performed at: 6/28/2025 11:26 PM  Block Placement  Patient position: sitting  Prep: ChloraPrep  Sterility prep: cap, drape, gloves, hand and mask  Sedation level: no sedation  Patient monitoring: blood pressure and continuous pulse oximetry  Approach: midline  Local numbing: lidocaine 1% to skin and subcutaneous tissues  Vertebral space: lumbar  Lumbar location: L3-L4  Epidural  Loss of resistance technique: saline  Guidance: landmark technique        Needle  Needle type: Tuohy   Needle gauge: 17  Needle length: 8.9cm  Needle insertion depth: 6 cm  Catheter size: 20 G  Catheter at skin depth: 11 cm  Catheter securement method: liquid medical adhesive    Test dose: lidocaine 1.5% with epinephrine 1-to-200,000  Test dose: lidocaine 1.5% with epinephrine 1-to-200,000    PCEA  Medication concentration used: 0.2% Ropivacaine with 2 mcg/mL Fentanyl  Dose (mL): 3  Lockout (minutes): 30  1-Hour Limit (boluses/hr): 2  Basal Rate: 5        Assessment  Sensory level: T10  Block outcome: pain improved  Number of attempts: 1  Procedure assessment: patient tolerated procedure well with no immediate complications

## 2025-06-30 PROCEDURE — 1210000001 HC SEMI-PRIVATE ROOM DAILY

## 2025-06-30 PROCEDURE — 2500000001 HC RX 250 WO HCPCS SELF ADMINISTERED DRUGS (ALT 637 FOR MEDICARE OP): Mod: SE

## 2025-06-30 RX ADMIN — ACETAMINOPHEN 975 MG: 325 TABLET ORAL at 18:18

## 2025-06-30 RX ADMIN — ACETAMINOPHEN 975 MG: 325 TABLET ORAL at 12:26

## 2025-06-30 RX ADMIN — ACETAMINOPHEN 975 MG: 325 TABLET ORAL at 00:40

## 2025-06-30 RX ADMIN — IBUPROFEN 600 MG: 600 TABLET ORAL at 00:40

## 2025-06-30 RX ADMIN — IBUPROFEN 600 MG: 600 TABLET ORAL at 12:26

## 2025-06-30 RX ADMIN — IBUPROFEN 600 MG: 600 TABLET ORAL at 06:54

## 2025-06-30 RX ADMIN — IBUPROFEN 600 MG: 600 TABLET ORAL at 18:18

## 2025-06-30 RX ADMIN — ACETAMINOPHEN 975 MG: 325 TABLET ORAL at 06:54

## 2025-06-30 ASSESSMENT — PAIN SCALES - GENERAL
PAINLEVEL_OUTOF10: 3
PAINLEVEL_OUTOF10: 0 - NO PAIN
PAINLEVEL_OUTOF10: 0 - NO PAIN
PAINLEVEL_OUTOF10: 2
PAIN_LEVEL: 2

## 2025-06-30 ASSESSMENT — PAIN DESCRIPTION - DESCRIPTORS
DESCRIPTORS: DISCOMFORT;SORE;CRAMPING
DESCRIPTORS: CRAMPING;DISCOMFORT;SORE

## 2025-06-30 NOTE — CARE PLAN
The patient's goals for the shift include to rest    The clinical goals for the shift include maintain normal vital signs    Over the shift, the patient did make progress toward the following goals.

## 2025-06-30 NOTE — LACTATION NOTE
Lactation Consultant Note  Lactation Consultation  Reason for Consult: Follow-up assessment  Consultant Name: Shalini Batista RN IBCLC    Maternal Information  Has mother  before?: Yes  How long did the mother previously breastfeed?: 2 years with her now 4 yr old son  Previous Maternal Breastfeeding Challenges: Tongue tie, Other (Comment) (tongue/lip tie (corrected))  Infant to breast within first 2 hours of birth?: Yes  Exclusive Pump and Bottle Feed: No    Maternal Assessment  Breast Assessment: Medium, Soft, Compressible, Breast changes observed in pregnancy, Other (Comment) (only left breast observed)  Nipple Assessment: Intact, Erect, Large diameter, Other (Comment) (only left nipple observed)  Areola Assessment: Normal, Other (Comment) (only left areola observed)    Infant Assessment  Infant Behavior: Readiness to feed, Feeding cues observed, Rooting response, Light sleep  Infant Assessment: Good cupping of tongue, Tongue protrudes over alveolar ridge    Feeding Assessment  Nutrition Source: Breastmilk, Formula (per mother’s request), Other (Comment) (two bottles overnight per mothers request due to exhaustion)  Feeding Method: Nursing at the breast  Feeding Position: Cross - cradle, Skin to skin, Mother needs assistance with latch/positioning (minimal assistance needed)  Suck/Feeding: Sustained, Coordinated suck/swallow/breathe, Audible swallowing, Tactile stimulation needed (stimulation required was minimal)  Latch Assessment: Minimal assistance is needed, Instructed on deep latch, Eagerly grasped on to latch, Deep latch obtained, Mouth not open wide enough, Bursts of sucking, swallowing, and rest, Chin moves in rhythmic motion, Upper lip turned in, Lower lip turned in, Comfortable latch, Other (Comment), Shallow latch (assisted to flange lips further to increase comfort of latch)    LATCH TOOL  Latch: Repeated attempts, hold nipple in mouth, stimulate to suck  Audible Swallowing: Spontaneous and  intermittent (24 hours old)  Type of Nipple: Everted (After stimulation)  Comfort (Breast/Nipple): Soft/non-tender  Hold (Positioning): Minimal assist, teach one side, mother does other, staff holds  LATCH Score: 8    Breast Pump       Other OB Lactation Tools       Patient Follow-up  Inpatient Lactation Follow-up Needed : Yes  Outpatient Lactation Follow-up: Recommended (if needed)    Other OB Lactation Documentation  Maternal Risk Factors: Polycystic ovary syndrome  Infant Risk Factors: Early term birth 37-39 weeks    Recommendations/Summary  Infant in mothers arm at time of my arrival to room. Infant in a light sleep and was beginning to exhibit hunger cues. Observed mother latch infant to her breast using a cross cradle hold. Reviewed with mother the importance of providing good and adequate support towards both her breast and infant throughout feedings as she maybe more accustomed to nursing an older child since mother breast fed her older son until he ws [age] years old. Minimal guidance and assistance was needed to obtain a deep and comfortable latch with feed. Instructed mother to wait for infant to open widely before bringing infant up onto her breast. Maternal nipple diameter is a bit larger. Infant latched readily but some minimal assistance was needed to increase depth of latch and flanged infants lips better to obtain a more comfortable latch at the breast. Instructed mother on how to also visualize nipple shape at end of feeding to confirm a deep and optimal latch at breast. Infant nursed well with bursts of rhythmic sucking and intermittent swallows appreciated. Some swallows were audible. Instructed mother to call if further feeding assistance is needed. Mother has a breast pump for home.

## 2025-06-30 NOTE — PROGRESS NOTES
Postpartum Progress Note    Assessment/Plan   Marcella Lu is a 29 y.o., , who delivered at 38w4d gestation    Now PPD#1 s/p Vaginal, Spontaneous on 2025  - Continue routine postpartum care  - Pain well controlled on po medications  - DVT risk score DVT Score (IF A SCORE IS NOT CALCULATING, MUST SELECT A BMI TO COMPLETE): 3 , ppx with SCDs and ambulation  - RH positive, rhogam not indicated  - Hgb:   Results from last 7 days   Lab Units 25  2241   HEMOGLOBIN g/dL 14.2      Mild Range BP  - x1, no diagnosis of HTN  - Reviewed signs elevated of BP  - Asymptomatic during postpartum course     Maternal Well-Being  - Vitals stable  - All questions and concerns address     Feeding  - Breastfeeding/pumping encouraged  - Lactation consult prn    Contraception  - Defers to 6wk visit  - Education provided    Dispo  - Anticipate d/c on PPD #2 if meeting all postpartum milestones  - Follow-up in 4-6wks with primary DEJA Calhoun-CNP     Assessment & Plan  Normal labor ([facility])    Pregnancy Problems (from 24 to present)       Problem Noted Diagnosed Resolved    Mother positive for group B Streptococcus colonization 2025 by Adrienne Brandon MD  No    Priority:  Medium       Overview Signed 2025  1:39 PM by Adrienne Brandon MD   - Plan for antibiotics in labor         Maternal excessive weight gain, third trimester ([facility]) 2025 by Adrienne Brandon MD  No    Priority:  Medium       Overview Addendum 2025  1:48 PM by Ginny Thomas MD   - Plan 30 week growth US (EFW 37%tile at 31w5d), scheduled for 37 week ultrasound         History of molar pregnancy in first trimester, antepartum ([facility]) 2024 by Adrienne Brandon MD  No    Priority:  Medium       Overview Signed 2025  3:16 PM by Adrienne Brandon MD   - Plan for placental path after delivery         38 weeks gestation of pregnancy ([facility]) 2024 by Adrienne Brandon MD   No    Priority:  Medium       Overview Addendum 2025  1:39 PM by Adrienne Brandon MD   Desired provider in labor: [] CNM  [] Physician  [x] Blood Products: [x] Yes, accepts [] No, needs counseling  [x] Initial BMI: Could not be calculated   [x] Prenatal Labs: Normal  [x] Cervical Cancer Screening up to date  [x] Rh status: pos  [x] Genetic Screening: normal  [x] NT US: (11-13 wks) result pending  [x] Baby ASA (if indicated):  [x] Pregnancy dated by: 6 week US    [x] Anatomy US: (19-20 wks)  [x] Federal Sterilization consent signed (if indicated): N/A  [x] 1hr GCT at 24-28wks:  [x] Rhogam (if indicated): N/A  [] Fetal Surveillance (if indicated):  [x] Tdap (27-32 wks, may be given up to 36 wks if initial window missed):   [x] RSV (32-36 wks) (Sept. to end of ): N/A  [] Flu Vaccine:    [x] Breastfeeding: received pump, momcozy  [x] Postpartum Birth control method: Interval ParaGard  [x] GBS at 36 - 37 wks: positive  [x] 39 weeks discussion of IOL vs. Expectant management: Expectant  [x] Mode of delivery ( anticipated ):           Increased urinary frequency during pregnancy ([facility]) 2025 by Adrienne Brandon MD  2025 by Adrienne Brandon MD    Priority:  Medium               Subjective     Misada Muntaser is PPD#1 s/p vaginal delivery who reports feeling overall well.  No acute events overnight.  Voiding spontaneously, passing flatus.  Pain well controlled on PO meds.  Light lochia. Tolerating diet.  Denies HA, CP, SOB, RUQ pain, vision changes or N/V. Denies dizziness, lightheadedness, LOC, or uncontrolled bleeding.    Objective   Allergies:   Pineapple, Nickel, Nitrofurantoin monohyd/m-cryst, and Trace metals         Last Vitals:  Temp Pulse Resp BP MAP Pulse Ox   36.3 °C (97.3 °F) 86 18 129/86   95 %     Vitals Min/Max Last 24 Hours:  Temp  Min: 36 °C (96.8 °F)  Max: 36.7 °C (98.1 °F)  Pulse  Min: 68  Max: 86  Resp  Min: 16  Max: 20  BP  Min: 106/68  Max: 130/91    Intake/Output:   No  intake or output data in the 24 hours ending 06/30/25 7407    Physical Exam:  General: examination reveals a well developed, well nourished, female, in no acute distress. She is alert and cooperative.  HEENT: external ears normal. Nose normal, no erythema or discharge.  Neck: supple, no significant adenopathy  Lungs: breathing even and unlabored, lungs clear  Cardiac: warm and well perfused, heart rate regular  Fundus: firm and below umbilicus, lochia light  Abdominal: soft, non-tender, non-distended, bowel sounds active  Extremities: no redness or tenderness in the calves or thighs, edema: not present  Neurological: alert, oriented, normal speech, no focal findings or movement disorder noted.  Psychological: awake and alert; oriented to person, place, and time.  Skin: no rashes or lesions    Lab Data:  Labs in chart were reviewed.

## 2025-06-30 NOTE — ANESTHESIA POSTPROCEDURE EVALUATION
Patient: Marcella Lu    Procedure Summary       Date: 25 Room / Location:     Anesthesia Start: 9 Anesthesia Stop: 25    Procedure: Labor Analgesia Diagnosis:     Scheduled Providers:  Responsible Provider: Maggie Butler MD    Anesthesia Type: epidural ASA Status: 2            Anesthesia Type: epidural    Marcella Lu is a 29 y.o., , who had a Vaginal, Spontaneous delivery on 2025 at 38w4d and is now POD1.    She had Neuraxial Anesthesia without immediate complications noted.       Pain well controlled    Vitals:    25 0725   BP: (!) 130/91   Pulse: 77   Resp: 16   Temp: 36.5 °C (97.7 °F)   SpO2: 95%       Neuraxial site assessed. No visible redness or swelling or drainage. Patient able to ambulate and move all extremities without difficulty. Able to void. No complaints of nausea/vomiting. Tolerating PO intake well. No s/sx of PDPH.     Anesthesia will sign off     Antoinette Yen MD        Anesthesia Post Evaluation    Patient location during evaluation: bedside  Patient participation: complete - patient participated  Level of consciousness: awake and alert  Pain score: 2  Pain management: adequate  Airway patency: patent  Cardiovascular status: acceptable and hemodynamically stable  Respiratory status: acceptable  Hydration status: acceptable  Postoperative Nausea and Vomiting: none      No notable events documented.

## 2025-07-01 PROBLEM — O13.9 GESTATIONAL HYPERTENSION (HHS-HCC): Status: ACTIVE | Noted: 2025-07-01

## 2025-07-01 LAB
ALBUMIN SERPL BCP-MCNC: 3.2 G/DL (ref 3.4–5)
ALP SERPL-CCNC: 84 U/L (ref 33–110)
ALT SERPL W P-5'-P-CCNC: 22 U/L (ref 7–45)
ANION GAP SERPL CALC-SCNC: 12 MMOL/L (ref 10–20)
AST SERPL W P-5'-P-CCNC: 17 U/L (ref 9–39)
BILIRUB SERPL-MCNC: 0.3 MG/DL (ref 0–1.2)
BUN SERPL-MCNC: 11 MG/DL (ref 6–23)
CALCIUM SERPL-MCNC: 8.9 MG/DL (ref 8.6–10.6)
CHLORIDE SERPL-SCNC: 109 MMOL/L (ref 98–107)
CO2 SERPL-SCNC: 22 MMOL/L (ref 21–32)
CREAT SERPL-MCNC: 0.68 MG/DL (ref 0.5–1.05)
EGFRCR SERPLBLD CKD-EPI 2021: >90 ML/MIN/1.73M*2
ERYTHROCYTE [DISTWIDTH] IN BLOOD BY AUTOMATED COUNT: 14.4 % (ref 11.5–14.5)
GLUCOSE SERPL-MCNC: 78 MG/DL (ref 74–99)
HCT VFR BLD AUTO: 39.4 % (ref 36–46)
HGB BLD-MCNC: 12.5 G/DL (ref 12–16)
MCH RBC QN AUTO: 26.9 PG (ref 26–34)
MCHC RBC AUTO-ENTMCNC: 31.7 G/DL (ref 32–36)
MCV RBC AUTO: 85 FL (ref 80–100)
NRBC BLD-RTO: 0 /100 WBCS (ref 0–0)
PLATELET # BLD AUTO: 167 X10*3/UL (ref 150–450)
POTASSIUM SERPL-SCNC: 4 MMOL/L (ref 3.5–5.3)
PROT SERPL-MCNC: 5.3 G/DL (ref 6.4–8.2)
RBC # BLD AUTO: 4.64 X10*6/UL (ref 4–5.2)
SODIUM SERPL-SCNC: 139 MMOL/L (ref 136–145)
WBC # BLD AUTO: 7.9 X10*3/UL (ref 4.4–11.3)

## 2025-07-01 PROCEDURE — 99232 SBSQ HOSP IP/OBS MODERATE 35: CPT

## 2025-07-01 PROCEDURE — 2500000002 HC RX 250 W HCPCS SELF ADMINISTERED DRUGS (ALT 637 FOR MEDICARE OP, ALT 636 FOR OP/ED): Mod: SE

## 2025-07-01 PROCEDURE — 84075 ASSAY ALKALINE PHOSPHATASE: CPT

## 2025-07-01 PROCEDURE — 85027 COMPLETE CBC AUTOMATED: CPT

## 2025-07-01 PROCEDURE — 1210000001 HC SEMI-PRIVATE ROOM DAILY

## 2025-07-01 PROCEDURE — 36415 COLL VENOUS BLD VENIPUNCTURE: CPT

## 2025-07-01 PROCEDURE — 2500000001 HC RX 250 WO HCPCS SELF ADMINISTERED DRUGS (ALT 637 FOR MEDICARE OP): Mod: SE

## 2025-07-01 RX ORDER — NIFEDIPINE 30 MG/1
30 TABLET, FILM COATED, EXTENDED RELEASE ORAL
Status: DISCONTINUED | OUTPATIENT
Start: 2025-07-01 | End: 2025-07-02 | Stop reason: HOSPADM

## 2025-07-01 RX ADMIN — ACETAMINOPHEN 975 MG: 325 TABLET ORAL at 12:03

## 2025-07-01 RX ADMIN — IBUPROFEN 600 MG: 600 TABLET ORAL at 00:10

## 2025-07-01 RX ADMIN — NIFEDIPINE 30 MG: 30 TABLET, FILM COATED, EXTENDED RELEASE ORAL at 09:50

## 2025-07-01 RX ADMIN — ACETAMINOPHEN 975 MG: 325 TABLET ORAL at 06:39

## 2025-07-01 RX ADMIN — ACETAMINOPHEN 975 MG: 325 TABLET ORAL at 00:10

## 2025-07-01 RX ADMIN — ACETAMINOPHEN 975 MG: 325 TABLET ORAL at 19:01

## 2025-07-01 RX ADMIN — IBUPROFEN 600 MG: 600 TABLET ORAL at 06:39

## 2025-07-01 RX ADMIN — IBUPROFEN 600 MG: 600 TABLET ORAL at 12:03

## 2025-07-01 RX ADMIN — IBUPROFEN 600 MG: 600 TABLET ORAL at 19:01

## 2025-07-01 ASSESSMENT — PAIN SCALES - GENERAL
PAINLEVEL_OUTOF10: 0 - NO PAIN
PAINLEVEL_OUTOF10: 0 - NO PAIN
PAINLEVEL_OUTOF10: 2
PAINLEVEL_OUTOF10: 0 - NO PAIN
PAINLEVEL_OUTOF10: 0 - NO PAIN

## 2025-07-01 NOTE — PROGRESS NOTES
07/01/25 1120   Discharge Planning   Home or Post Acute Services   (Blood pressure Monitor)     Patient meets criteria for home monitoring of blood pressure post discharge.  Reason:  current gestational hypertension. Met with patient to assess for availability of home BP monitor.   Patient does not have access to BP monitor at home.  Pt agreed to order home BP monitor from [employer].   Large BP monitor delivered to room. Patient educated on importance of continuing to monitor BP at home, recording BP on home monitoring log and s/sx of when to call her provider.  Pt verbalized understanding the above information.

## 2025-07-01 NOTE — LACTATION NOTE
Lactation Consultant Note  Lactation Consultation  Reason for Consult: Follow-up assessment  Consultant Name: Dolly Gilbert, RN, IBCLC    Maternal Information  Has mother  before?: Yes    Maternal Assessment  Breast Assessment: Medium, Soft, Warm, Compressible  Nipple Assessment: Intact, Erect, Creased after feeding, Large diameter, Sore  Alterations in Nipple Condition: Stage I - pain or irritation with no skin break down  Areola Assessment: Normal    Infant Assessment  Infant Behavior: Feeding cues observed, Readiness to feed, Rooting response, Active alert  Infant Assessment:  (d/f)    Feeding Assessment  Nutrition Source: Breastmilk  Feeding Method: Nursing at the breast  Feeding Position: Baby led, One side per feeding, Mother demonstrates good positioning, Infant not tucked close and facing mother, Skin to skin, Nipple to nose, C - hold, Cross - cradle, Nose lightly touching breast, Misalignment of baby's head, trunk, and hips  Suck/Feeding: Sustained, Baby led rhythmically, Audible swallowing, Coordinated suck/swallow/breathe, Content after feeding  Latch Assessment: Instructed on deep latch, Latch achieved after repeated attempts, Flanged lips, Pain during feeding, Chin moves in rhythmic motion, Mouth open/moves head side to side, Eagerly grasped on to latch, Areolar attachment, Optimal angle of mouth opening, Sucks with long jaw movement, Comfortable latch, Chin and lower lip contact breast first, Wide open mouth < 160, Minimal assistance is needed, Latch achieved, Deep latch obtained, Comfortable with no pain, Bursts of sucking, swallowing, and rest, Shallow latch    LATCH TOOL  Latch: Grasps breast, tongue down, lips flanged, rhythmic sucking  Audible Swallowing: Spontaneous and intermittent (24 hours old)  Type of Nipple: Everted (After stimulation)  Comfort (Breast/Nipple): Filling, red/small blisters/bruises, mild/moderate discomfort  Hold (Positioning): Minimal assist, teach one side, mother  does other, staff holds  LATCH Score: 8    Breast Pump       Other OB Lactation Tools       Patient Follow-up  Inpatient Lactation Follow-up Needed : No  Outpatient Lactation Follow-up: Recommended  Lactation Professional - OK to Discharge: Yes    Other OB Lactation Documentation  Maternal Risk Factors: Polycystic ovary syndrome  Infant Risk Factors: Early term birth 37-39 weeks    Recommendations/Summary  LC was called into room to observe a feed. Mom was waking baby up as LC walked in. LC observed at the bedside. Mom kept baby swaddled up and on her back, trying to latch her in a cross-cradle hold on the left side. Mom has large diameter nipples - LC to get called back into room so LC can size mom. Mom tried multiple times to get baby to open up her mouth wide enough to latch where there was no pain. Mom did latch baby a few times but it was very painful for mom. Mom was not using pillow support so she was also hunching down. Mom receptive to allow LC to make some changes. LC placed a pillow on mom's abdomen and placed baby on her side and tucked her in super close to mom so she is belly to belly. Mom needed to widen her C-hold so baby had a wider surface area to latch onto. Mom did a good job at supporting baby's head and neck. Mom was bringing baby directly into her breast instead of leading with baby's lower lip and chin. LC demonstrated how to properly latch baby and how to sustain a deep asymmetrical latch. Baby eagerly grasped onto breast right away and led rhythmically with long jaw movements and audible swallows. Mom denied any pain. LC encouraged mom to try to use her wrist to help bring baby's head back slightly and lead with her lower lip and chin, mom states understanding. LC encouraged mom to unlatch baby if latch is painful and the pain does not go away. LC encouraged mom to latch baby every 2-3 hours or when hunger cues are shown.   Mom was previously given outpatient lactation.   Mom to call out  with further assistance.

## 2025-07-01 NOTE — PROGRESS NOTES
Postpartum Progress Note    Assessment/Plan   Marcella Lu is a 29 y.o., , who delivered at 38w4d gestation    Now PPD#2 s/p Vaginal, Spontaneous on 2025,  mL  - Continue routine postpartum care  - Pain well controlled on po medications  - DVT risk score DVT Score (IF A SCORE IS NOT CALCULATING, MUST SELECT A BMI TO COMPLETE): 3 , ppx with SCDs and ambulation  - RH positive, rhogam not indicated  - Hgb:   Results from last 7 days   Lab Units 25  0909 25  2241   HEMOGLOBIN g/dL 12.5 14.2      gHTN  - Dx by mild range BPs > 4 hrs apart on   - Non-sustained severe range BP   - Nifedipine 30 mg started  - Mild headache-> PO fluids-> resolved, otherwise asymptomatic  - HELLP labs n/f platelets 167, previously 137 on admission  - BP cuff for home  - Discussed recommendation for 3 day stay, patient agreeable      Maternal Well-Being  - Vitals stable  - All questions and concerns address     Feeding  - Breastfeeding/pumping encouraged  - Lactation consult prn    Contraception  - Defers to 6wk visit, plans for interval IUD  - Education provided    Dispo  - Anticipate d/c on PPD #3 if meeting all postpartum milestones  - Follow up in 2-5 days for BP check  - Follow-up in 4-6wks with primary MNOIKA Simons, APRN-CNP     Assessment & Plan  Normal labor ([facility])    Gestational hypertension ([facility])    Pregnancy Problems (from 24 to present)       Problem Noted Diagnosed Resolved    Mother positive for group B Streptococcus colonization 2025 by Adrienne Brandon MD  No    Priority:  Medium       Overview Signed 2025  1:39 PM by Adrienne Brandon MD   - Plan for antibiotics in labor         Maternal excessive weight gain, third trimester ([facility]) 2025 by Adrienne Brandon MD  No    Priority:  Medium       Overview Addendum 2025  1:48 PM by Ginny Thomas MD   - Plan 30 week growth US (EFW 37%tile at 31w5d), scheduled for 37 week  ultrasound         History of molar pregnancy in first trimester, antepartum ([facility]) 2024 by Adrienne Brandon MD  No    Priority:  Medium       Overview Signed 2025  3:16 PM by Adrienne Brandon MD   - Plan for placental path after delivery         38 weeks gestation of pregnancy ([facility]) 2024 by Adrienne Brandon MD  No    Priority:  Medium       Overview Addendum 2025  1:39 PM by Adrienne Brandon MD   Desired provider in labor: [] CNM  [] Physician  [x] Blood Products: [x] Yes, accepts [] No, needs counseling  [x] Initial BMI: Could not be calculated   [x] Prenatal Labs: Normal  [x] Cervical Cancer Screening up to date  [x] Rh status: pos  [x] Genetic Screening: normal  [x] NT US: (11-13 wks) result pending  [x] Baby ASA (if indicated):  [x] Pregnancy dated by: 6 week US    [x] Anatomy US: (19-20 wks)  [x] Federal Sterilization consent signed (if indicated): N/A  [x] 1hr GCT at 24-28wks:  [x] Rhogam (if indicated): N/A  [] Fetal Surveillance (if indicated):  [x] Tdap (27-32 wks, may be given up to 36 wks if initial window missed):   [x] RSV (32-36 wks) (Sept. to end ): N/A  [] Flu Vaccine:    [x] Breastfeeding: received pump, momcozy  [x] Postpartum Birth control method: Interval ParaGard  [x] GBS at 36 - 37 wks: positive  [x] 39 weeks discussion of IOL vs. Expectant management: Expectant  [x] Mode of delivery ( anticipated ):           Increased urinary frequency during pregnancy ([facility]) 2025 by Adrienne Brandon MD  2025 by Adrienne Brandon MD    Priority:  Medium               Subjective     Misada Muntaser is PPD#2 s/p vaginal delivery who reports feeling overall well.  No acute events overnight.  Voiding spontaneously, passing flatus.  Pain well controlled on PO meds.  Light lochia. Tolerating diet.  Denies CP, SOB, RUQ pain, vision changes or N/V. Denies dizziness, lightheadedness, LOC, or uncontrolled bleeding.    She noticed a very slight  headache when she took the first dose of nifedipine. Had had ~ 400 mL to drink today so far.    Objective   Allergies:   Pineapple, Nickel, Nitrofurantoin monohyd/m-cryst, and Trace metals         Last Vitals:  Temp Pulse Resp BP MAP Pulse Ox   36 °C (96.8 °F) 58 16 (!) 141/94   95 %     Vitals Min/Max Last 24 Hours:  Temp  Min: 36 °C (96.8 °F)  Max: 36.3 °C (97.3 °F)  Pulse  Min: 58  Max: 86  Resp  Min: 16  Max: 18  BP  Min: 118/82  Max: 160/107    Intake/Output:   No intake or output data in the 24 hours ending 07/01/25 1106    Physical Exam:  General: Examination reveals a well developed, well nourished, female, in no acute distress. She is alert and cooperative.  Lungs: symmetrical, non-labored breathing.  Cardiac: warm, well-perfused.  Abdomen: soft, non-tender.  Fundus: firm, below umbilicus, and nontender.  Extremities: no redness or tenderness in the calves or thighs.  Neurological: alert, oriented, normal speech, no focal findings or movement disorder noted.     Lab Data:  Labs in chart were reviewed.

## 2025-07-01 NOTE — CARE PLAN
The patient's goals for the shift include to rest and work on breastfeeding    The clinical goals for the shift include maintain normal vital signs    Over the shift, the patient did make progress toward the following goals.

## 2025-07-02 ENCOUNTER — DOCUMENTATION (OUTPATIENT)
Dept: UROLOGY | Facility: HOSPITAL | Age: 30
End: 2025-07-02

## 2025-07-02 ENCOUNTER — PHARMACY VISIT (OUTPATIENT)
Dept: PHARMACY | Facility: CLINIC | Age: 30
End: 2025-07-02
Payer: MEDICAID

## 2025-07-02 ENCOUNTER — APPOINTMENT (OUTPATIENT)
Dept: OBSTETRICS AND GYNECOLOGY | Facility: CLINIC | Age: 30
End: 2025-07-02
Payer: COMMERCIAL

## 2025-07-02 VITALS
HEART RATE: 63 BPM | TEMPERATURE: 97.3 F | HEIGHT: 66 IN | RESPIRATION RATE: 16 BRPM | SYSTOLIC BLOOD PRESSURE: 117 MMHG | OXYGEN SATURATION: 96 % | BODY MASS INDEX: 29.41 KG/M2 | DIASTOLIC BLOOD PRESSURE: 77 MMHG | WEIGHT: 182.98 LBS

## 2025-07-02 PROCEDURE — RXMED WILLOW AMBULATORY MEDICATION CHARGE

## 2025-07-02 PROCEDURE — 2500000002 HC RX 250 W HCPCS SELF ADMINISTERED DRUGS (ALT 637 FOR MEDICARE OP, ALT 636 FOR OP/ED): Mod: SE

## 2025-07-02 PROCEDURE — 99239 HOSP IP/OBS DSCHRG MGMT >30: CPT | Performed by: NURSE PRACTITIONER

## 2025-07-02 PROCEDURE — 2500000001 HC RX 250 WO HCPCS SELF ADMINISTERED DRUGS (ALT 637 FOR MEDICARE OP): Mod: SE

## 2025-07-02 RX ORDER — IBUPROFEN 600 MG/1
600 TABLET, FILM COATED ORAL EVERY 6 HOURS
Qty: 40 TABLET | Refills: 0 | Status: SHIPPED | OUTPATIENT
Start: 2025-07-02 | End: 2025-07-12

## 2025-07-02 RX ORDER — ADHESIVE BANDAGE
15 BANDAGE TOPICAL DAILY
Qty: 355 ML | Refills: 0 | Status: SHIPPED | OUTPATIENT
Start: 2025-07-02

## 2025-07-02 RX ORDER — ACETAMINOPHEN 325 MG/1
975 TABLET ORAL EVERY 6 HOURS
Qty: 120 TABLET | Refills: 0 | Status: SHIPPED | OUTPATIENT
Start: 2025-07-02 | End: 2025-07-12

## 2025-07-02 RX ORDER — NIFEDIPINE 30 MG/1
30 TABLET, FILM COATED, EXTENDED RELEASE ORAL
Qty: 30 TABLET | Refills: 1 | Status: SHIPPED | OUTPATIENT
Start: 2025-07-03 | End: 2025-09-01

## 2025-07-02 RX ADMIN — ACETAMINOPHEN 975 MG: 325 TABLET ORAL at 01:09

## 2025-07-02 RX ADMIN — IBUPROFEN 600 MG: 600 TABLET ORAL at 01:10

## 2025-07-02 RX ADMIN — IBUPROFEN 600 MG: 600 TABLET ORAL at 12:46

## 2025-07-02 RX ADMIN — ACETAMINOPHEN 975 MG: 325 TABLET ORAL at 06:48

## 2025-07-02 RX ADMIN — IBUPROFEN 600 MG: 600 TABLET ORAL at 06:48

## 2025-07-02 RX ADMIN — NIFEDIPINE 30 MG: 30 TABLET, FILM COATED, EXTENDED RELEASE ORAL at 06:48

## 2025-07-02 RX ADMIN — ACETAMINOPHEN 975 MG: 325 TABLET ORAL at 12:45

## 2025-07-02 ASSESSMENT — PAIN SCALES - GENERAL
PAINLEVEL_OUTOF10: 0 - NO PAIN
PAINLEVEL_OUTOF10: 3
PAINLEVEL_OUTOF10: 0 - NO PAIN

## 2025-07-02 NOTE — CARE PLAN
The patient's goals for the shift include Rest    The clinical goals for the shift include Maintain stable BP    Over the shift, the patient made progress toward the following goals.   Problem: Postpartum  Goal: Experiences normal postpartum course  Outcome: Progressing  Goal: Appropriate maternal -  bonding  Outcome: Progressing   Vitals are wnl. Minimum pain expressed. Patient resting.

## 2025-07-02 NOTE — PROGRESS NOTES
7/2/25    Patient consented to participate in Study [id number]: Postpartum Vaginal Estrogen. Written informed consent obtained.

## 2025-07-02 NOTE — DISCHARGE SUMMARY
Discharge Summary    Marcella Lu is a 29 y.o. year old female , who delivered at 38w4d gestation via Vaginal, Spontaneous after IOL in s/o labor, now PPD#3.    Admission Date: 2025  Discharge Date: 25       Discharge Diagnosis  Vaginal, Spontaneous    Hospital Course  Delivery Date: 2025 3:53 AM  Delivery type: Vaginal, Spontaneous   GA at delivery: 38w4d   Outcome: Living  Intrapartum complications: None  Feeding method: Breastfeeding Status: Yes     Procedures: none  Contraception at discharge: Defers to PPV, interested in IUD. We discussed pregnancy spacing of at least one year, abstaining from intercourse for 6wks, and the ability to become pregnant in the absence of regular menses. Pt verbalized understanding.      Feeling well. Pain well controlled. Lochia light. Denies HA, SOB, RUQ pain, vision changes.  Denies dizziness/lightheadedness, SOB, palpitations, extreme fatigue, heavy bleeding      Meeting all postpartum milestones. OK for DC today and follow up as below.   - Follow-up in 2-5 days for BP check  - Follow-up in 4-6wks with primary OGYN    Pertinent Physical Exam At Time of Discharge    General: Examination reveals a well developed, well nourished, female, in no acute distress. She is alert and cooperative.  HEENT: PERRLA. External ears normal. Nose normal, no erythema or discharge. Mouth and throat clear.  Neck: supple, no significant adenopathy.  Lungs: clear to auscultation bilaterally.  Cardiac: regular rate and rhythm, S1, S2 normal, no murmur, click, rub or gallop.  Abdomen: soft, gravid, nontender, nondistended, no abnormal masses, no epigastric pain.  Fundus: firm, below umbilicus, and nontender.  Perineum: well healing.  Extremities: no redness or tenderness in the calves or thighs, no edema.  Neurological: alert, oriented, normal speech, no focal findings or movement disorder noted, DTRs normal and symmetrical.  Psychological: awake and alert; oriented to person,  "place, and time.    Vitals  /77   Pulse 63   Temp 36.3 °C (97.3 °F) (Temporal)   Resp 16   Ht 1.676 m (5' 6\")   Wt 83 kg (182 lb 15.7 oz)   LMP 09/02/2024   SpO2 96%   Breastfeeding Yes   BMI 29.53 kg/m²      Discharge Meds     Your medication list        START taking these medications        Instructions Last Dose Given Next Dose Due   acetaminophen 325 mg tablet  Commonly known as: Tylenol      Take 3 tablets (975 mg) by mouth every 6 hours for 10 days.       ibuprofen 600 mg tablet      Take 1 tablet (600 mg) by mouth every 6 hours for 10 days.       magnesium hydroxide 400 mg/5 mL suspension  Commonly known as: Milk of Magnesia      Take 15 mL by mouth once daily.       NIFEdipine ER 30 mg 24 hr tablet  Commonly known as: Adalat CC  Start taking on: July 3, 2025      Take 1 tablet (30 mg) by mouth once daily in the morning. Take before meals. Do not crush, chew, or split.              CONTINUE taking these medications        Instructions Last Dose Given Next Dose Due   Prenatal 19 29 mg iron- 1 mg tablet  Generic drug: -iron fum-folic acid      Take 1 tablet by mouth once daily.              STOP taking these medications      aspirin 81 mg EC tablet        clindamycin phosphate 1 % gel, once daily  Commonly known as: Clindagel        metFORMIN 500 mg tablet  Commonly known as: Glucophage                  Where to Get Your Medications        These medications were sent to [facility]  [address] [place] [id number]      Hours: 8:30 AM to 5 PM Mon-Fri Phone: [phone]   acetaminophen 325 mg tablet  ibuprofen 600 mg tablet  magnesium hydroxide 400 mg/5 mL suspension  NIFEdipine ER 30 mg 24 hr tablet          Complications Requiring Follow-Up    gHTN  - Dx by mild range BPs > 4 hrs apart on 7/1  - Non-sustained severe range BP 7/1  -bp's mild range to normotensive today  - Nifedipine 30 mg, rx sent to pharmacy  - Mild headache-> PO fluids-> resolved, otherwise " asymptomatic  - HELLP labs n/f platelets 167, previously 137 on admission  - The signs and symptoms of PEC were reviewed with the patient, including unrelenting headache, vision changes/blurred vision, and pain underneath the right breast.   - BP cuff for home for checking BP BID. Pt instructed to call primary OB if SBP > 160 or DBP > 110.      Test Results Pending At Discharge  Pending Labs       No current pending labs.            Outpatient Follow-Up    I spent 20 minutes in the professional and overall care of this patient.      DEJA Celaya-CNP

## 2025-07-03 ENCOUNTER — APPOINTMENT (OUTPATIENT)
Dept: OBSTETRICS AND GYNECOLOGY | Facility: HOSPITAL | Age: 30
End: 2025-07-03
Payer: COMMERCIAL

## 2025-07-08 ENCOUNTER — APPOINTMENT (OUTPATIENT)
Dept: OBSTETRICS AND GYNECOLOGY | Facility: CLINIC | Age: 30
End: 2025-07-08
Payer: COMMERCIAL

## 2025-07-09 ENCOUNTER — APPOINTMENT (OUTPATIENT)
Dept: OBSTETRICS AND GYNECOLOGY | Facility: CLINIC | Age: 30
End: 2025-07-09
Payer: COMMERCIAL

## 2025-07-30 ENCOUNTER — APPOINTMENT (OUTPATIENT)
Dept: OBSTETRICS AND GYNECOLOGY | Facility: CLINIC | Age: 30
End: 2025-07-30
Payer: COMMERCIAL

## 2025-08-06 ENCOUNTER — APPOINTMENT (OUTPATIENT)
Dept: OBSTETRICS AND GYNECOLOGY | Facility: CLINIC | Age: 30
End: 2025-08-06
Payer: COMMERCIAL

## 2025-08-06 VITALS — DIASTOLIC BLOOD PRESSURE: 80 MMHG | BODY MASS INDEX: 27.54 KG/M2 | SYSTOLIC BLOOD PRESSURE: 120 MMHG | WEIGHT: 170.6 LBS

## 2025-08-06 DIAGNOSIS — Z87.59 HISTORY OF MOLAR PREGNANCY: Primary | ICD-10-CM

## 2025-08-06 DIAGNOSIS — Z30.430 ENCOUNTER FOR IUD INSERTION: ICD-10-CM

## 2025-08-06 PROBLEM — O13.9 GESTATIONAL HYPERTENSION (HHS-HCC): Status: RESOLVED | Noted: 2025-07-01 | Resolved: 2025-08-06

## 2025-08-06 PROBLEM — L65.9 NONSCARRING HAIR LOSS: Status: RESOLVED | Noted: 2025-01-15 | Resolved: 2025-08-06

## 2025-08-06 PROBLEM — O09.A1: Status: RESOLVED | Noted: 2024-11-27 | Resolved: 2025-08-06

## 2025-08-06 PROBLEM — Z3A.38 38 WEEKS GESTATION OF PREGNANCY (HHS-HCC): Status: RESOLVED | Noted: 2024-11-27 | Resolved: 2025-08-06

## 2025-08-06 PROBLEM — Z37.9 NORMAL LABOR (HHS-HCC): Status: RESOLVED | Noted: 2025-06-28 | Resolved: 2025-08-06

## 2025-08-06 PROBLEM — O26.03: Status: RESOLVED | Noted: 2025-04-09 | Resolved: 2025-08-06

## 2025-08-06 PROCEDURE — 0503F POSTPARTUM CARE VISIT: CPT | Performed by: OBSTETRICS & GYNECOLOGY

## 2025-08-06 PROCEDURE — 58300 INSERT INTRAUTERINE DEVICE: CPT | Performed by: OBSTETRICS & GYNECOLOGY

## 2025-08-06 ASSESSMENT — EDINBURGH POSTNATAL DEPRESSION SCALE (EPDS)
THINGS HAVE BEEN GETTING ON TOP OF ME: NO, MOST OF THE TIME I HAVE COPED QUITE WELL
I HAVE BEEN ABLE TO LAUGH AND SEE THE FUNNY SIDE OF THINGS: AS MUCH AS I ALWAYS COULD
I HAVE FELT SCARED OR PANICKY FOR NO GOOD REASON: NO, NOT AT ALL
THE THOUGHT OF HARMING MYSELF HAS OCCURRED TO ME: NEVER
I HAVE FELT SAD OR MISERABLE: NO, NOT AT ALL
I HAVE BEEN ANXIOUS OR WORRIED FOR NO GOOD REASON: NO, NOT AT ALL
I HAVE BLAMED MYSELF UNNECESSARILY WHEN THINGS WENT WRONG: NO, NEVER
TOTAL SCORE: 1
I HAVE BEEN SO UNHAPPY THAT I HAVE BEEN CRYING: NO, NEVER
I HAVE LOOKED FORWARD WITH ENJOYMENT TO THINGS: AS MUCH AS I EVER DID
I HAVE BEEN SO UNHAPPY THAT I HAVE HAD DIFFICULTY SLEEPING: NOT AT ALL

## 2025-08-06 ASSESSMENT — PAIN SCALES - GENERAL: PAINLEVEL_OUTOF10: 0-NO PAIN

## 2025-08-06 NOTE — PROGRESS NOTES
Patient ID: Marcella Lu is a 29 y.o. female.    IUD Management    Performed by: Adrienne Brandon MD  Authorized by: Adrienne Brandon MD    Procedure: IUD insertion    Consent obtained by patient, parent, or legal power of  - including discussion of procedure risks and benefits, patient questions answered, and patient education provided: yes    Pregnancy risk: reasonably certain the patient is not pregnant    Date/Time of Insertion:  8/6/2025 4:17 PM  Speculum placed in vagina: yes    Cervix cleaned and prepped: yes    Tenaculum/Allis/Ring Forceps applied to cervix: yes    Anesthesia used: no    Uterus sound depth (cm):  6  IUD inserted without complications: yes    OSM: copper 380 square mm  Strings trimmed to (cm):  3  Patient tolerated procedure well: yes    Estimated blood loss (mL):  1  Intended removal date: 12 years or as needed.    Adrienne Brandon MD

## 2025-08-06 NOTE — PROGRESS NOTES
SUBJECTIVE  Marcella Lu is a 29 y.o.  female who presents for a postpartum visit. The delivery was at 38 gestational weeks on   Patient was admitted to L&D for labor  Outcome: spontaneous vaginal delivery  L&D complications: None      I have fully reviewed the prenatal and intrapartum course.      Antepartum problems:  Pregnancy Problems (from 24 to present)       Problem Noted Diagnosed Resolved    Gestational hypertension ([facility]) 2025 by DEJA Byrd-CNP  No    Priority:  Medium       Mother positive for group B Streptococcus colonization 2025 by Adrienne Brandon MD  No    Priority:  Medium       Overview Signed 2025  1:39 PM by Adrienne Brandon MD   - Plan for antibiotics in labor         Maternal excessive weight gain, third trimester ([facility]) 2025 by Adrienne Brandon MD  No    Priority:  Medium       Overview Addendum 2025  1:48 PM by Ginny Thomas MD   - Plan 30 week growth US (EFW 37%tile at 31w5d), scheduled for 37 week ultrasound         History of molar pregnancy in first trimester, antepartum ([facility]) 2024 by Adrienne Brandon MD  No    Priority:  Medium       Overview Signed 2025  3:16 PM by Adrienne Brandon MD   - Plan for placental path after delivery         38 weeks gestation of pregnancy ([facility]) 2024 by Adrienne Brandon MD  No    Priority:  Medium       Overview Addendum 2025  1:39 PM by Adrienne Brandon MD   Desired provider in labor: [] CNM  [] Physician  [x] Blood Products: [x] Yes, accepts [] No, needs counseling  [x] Initial BMI: Could not be calculated   [x] Prenatal Labs: Normal  [x] Cervical Cancer Screening up to date  [x] Rh status: pos  [x] Genetic Screening: normal  [x] NT US: (11-13 wks) result pending  [x] Baby ASA (if indicated):  [x] Pregnancy dated by: 6 week US    [x] Anatomy US: (19-20 wks)  [x] Federal Sterilization consent signed (if indicated): N/A  [x] 1hr GCT at  24-28wks:  [x] Rhogam (if indicated): N/A  [] Fetal Surveillance (if indicated):  [x] Tdap (27-32 wks, may be given up to 36 wks if initial window missed):   [x] RSV (32-36 wks) (Sept. to end ): N/A  [] Flu Vaccine:    [x] Breastfeeding: received pump, momcozy  [x] Postpartum Birth control method: Interval ParaGard  [x] GBS at 36 - 37 wks: positive  [x] 39 weeks discussion of IOL vs. Expectant management: Expectant  [x] Mode of delivery ( anticipated ):           Increased urinary frequency during pregnancy ([facility]) 2025 by Adrienne Brandon MD  2025 by Adrienne Brandon MD    Priority:  Medium                 Birth experience: Great!  Postpartum course has been good  Baby's course has been good  Baby is feeding by breast   Bleeding no bleeding  Bowel function is normal  Bladder function is normal  Patient is not sexually active  Contraception method is IUD.     OB History          3    Para   2    Term   2       0    AB   1    Living   2         SAB   0    IAB   0    Ectopic   0    Multiple   0    Live Births   2               # 1 - Date: None, Sex: None, Weight: None, GA: None, Type: Incomplete - Surgical Treatment, Apgar1: None, Apgar5: None, Living: None, Birth Comments: None    # 2 - Date: 2021, Sex: Male, Weight: 3.402 kg, GA: 39w0d, Type: Vaginal, Spontaneous, Apgar1: None, Apgar5: None, Living: Living, Birth Comments: None    # 3 - Date: 25, Sex: Female, Weight: 3.3 kg, GA: 38w4d, Type: Vaginal, Spontaneous, Apgar1: 8, Apgar5: 9, Living: Living, Birth Comments: None     Immunization History   Administered Date(s) Administered    Moderna SARS-CoV-2 Vaccination 2021, 2021    Tdap vaccine, age 7 year and older (BOOSTRIX, ADACEL) 2025     Postpartum Depression: Low Risk  (2025)    [place]  Depression Scale     Last EPDS Total Score: 1     Last EPDS Self Harm Result: Never     Intimate Partner Violence: Not At Risk (2025)     Humiliation, Afraid, Rape, and Kick questionnaire     Fear of Current or Ex-Partner: No     Emotionally Abused: No     Physically Abused: No     Sexually Abused: No     Tobacco Use: Low Risk  (8/6/2025)    Patient History     Smoking Tobacco Use: Never     Smokeless Tobacco Use: Never     Passive Exposure: Not on file      Alcohol Use: Not At Risk (6/28/2025)    AUDIT-C     Frequency of Alcohol Consumption: Never     Average Number of Drinks: Patient does not drink     Frequency of Binge Drinking: Never      Social History     Substance and Sexual Activity   Drug Use Never         OBJECTIVE  /80   Wt 77.4 kg (170 lb 9.6 oz)   LMP 09/02/2024   Breastfeeding Yes   BMI 27.54 kg/m²    Body mass index is 27.54 kg/m².    Physical Exam  Constitutional:       General: She is not in acute distress.     Appearance: Normal appearance.   Genitourinary:      Vulva and rectum normal.      Right Labia: No skin changes.     Left Labia: No skin changes.     No vaginal discharge.      No cervical discharge or lesion.   HENT:      Head: Normocephalic and atraumatic.      Nose: Nose normal.      Mouth/Throat:      Mouth: Mucous membranes are moist.      Pharynx: Oropharynx is clear.     Eyes:      Extraocular Movements: Extraocular movements intact.      Conjunctiva/sclera: Conjunctivae normal.      Pupils: Pupils are equal, round, and reactive to light.       Cardiovascular:      Rate and Rhythm: Normal rate.      Pulses: Normal pulses.   Pulmonary:      Effort: Pulmonary effort is normal.   Abdominal:      General: Abdomen is flat. There is no distension.      Palpations: Abdomen is soft.      Tenderness: There is no abdominal tenderness. There is no guarding or rebound.     Musculoskeletal:         General: Normal range of motion.     Neurological:      General: No focal deficit present.      Mental Status: She is alert and oriented to person, place, and time.     Skin:     General: Skin is warm and dry.     Psychiatric:          Mood and Affect: Mood normal.         Behavior: Behavior normal.   Vitals reviewed.           Last Pap: approximate date 2023 and was normal (ASCUS HPV neg)     ASSESSMENT & PLAN    History of molar pregnancy  - HCG today    -Reviewed safe sleep, ABCs, smoke-free environment advised  -Infant feeding support provided  -Discussed contraception - copper IUD today  -Screenings for PPD and intimate partner violence performed  -Cervical cancer screening up to date  -Immunizations rubella immune  -Pertinent pregnancy complications reviewed and discussed, if applicable  -BP normal - ok to discontinue nifedipine     Follow up in 3-6 months for annual exam or sooner as needed     Adrienne Brandon MD  Obstetrics & Gynecology  08/06/25

## 2025-08-12 ENCOUNTER — TELEPHONE (OUTPATIENT)
Dept: UROLOGY | Facility: HOSPITAL | Age: 30
End: 2025-08-12
Payer: COMMERCIAL

## 2025-08-12 DIAGNOSIS — Z00.6 EXAMINATION OF PARTICIPANT OR CONTROL IN CLINICAL RESEARCH: Primary | ICD-10-CM

## 2025-08-13 ENCOUNTER — APPOINTMENT (OUTPATIENT)
Dept: OBSTETRICS AND GYNECOLOGY | Facility: CLINIC | Age: 30
End: 2025-08-13
Payer: COMMERCIAL

## 2025-08-15 DIAGNOSIS — N97.0 SECONDARY ANOVULATORY INFERTILITY: ICD-10-CM

## 2026-02-18 ENCOUNTER — APPOINTMENT (OUTPATIENT)
Dept: OBSTETRICS AND GYNECOLOGY | Facility: CLINIC | Age: 31
End: 2026-02-18
Payer: COMMERCIAL